# Patient Record
Sex: MALE | Race: OTHER | NOT HISPANIC OR LATINO
[De-identification: names, ages, dates, MRNs, and addresses within clinical notes are randomized per-mention and may not be internally consistent; named-entity substitution may affect disease eponyms.]

---

## 2017-04-18 ENCOUNTER — APPOINTMENT (OUTPATIENT)
Dept: UROLOGY | Facility: CLINIC | Age: 57
End: 2017-04-18

## 2017-05-04 ENCOUNTER — OUTPATIENT (OUTPATIENT)
Dept: OUTPATIENT SERVICES | Facility: HOSPITAL | Age: 57
LOS: 1 days | End: 2017-05-04
Payer: COMMERCIAL

## 2017-05-04 PROCEDURE — 75571 CT HRT W/O DYE W/CA TEST: CPT

## 2017-05-04 PROCEDURE — 75571 CT HRT W/O DYE W/CA TEST: CPT | Mod: 26

## 2017-05-11 LAB — PSA SERPL-MCNC: 0.61 NG/ML

## 2018-02-07 ENCOUNTER — EMERGENCY (EMERGENCY)
Facility: HOSPITAL | Age: 58
LOS: 1 days | Discharge: HOME | End: 2018-02-07
Admitting: EMERGENCY MEDICINE
Payer: COMMERCIAL

## 2018-02-07 VITALS
DIASTOLIC BLOOD PRESSURE: 92 MMHG | RESPIRATION RATE: 16 BRPM | TEMPERATURE: 98 F | HEIGHT: 69 IN | HEART RATE: 85 BPM | SYSTOLIC BLOOD PRESSURE: 172 MMHG | WEIGHT: 199.96 LBS | OXYGEN SATURATION: 99 %

## 2018-02-07 DIAGNOSIS — E11.9 TYPE 2 DIABETES MELLITUS WITHOUT COMPLICATIONS: ICD-10-CM

## 2018-02-07 DIAGNOSIS — Z79.4 LONG TERM (CURRENT) USE OF INSULIN: ICD-10-CM

## 2018-02-07 DIAGNOSIS — H72.92 UNSPECIFIED PERFORATION OF TYMPANIC MEMBRANE, LEFT EAR: ICD-10-CM

## 2018-02-07 DIAGNOSIS — H92.02 OTALGIA, LEFT EAR: ICD-10-CM

## 2018-02-07 DIAGNOSIS — Z79.899 OTHER LONG TERM (CURRENT) DRUG THERAPY: ICD-10-CM

## 2018-02-07 PROCEDURE — 99284 EMERGENCY DEPT VISIT MOD MDM: CPT

## 2018-02-07 PROCEDURE — 99283 EMERGENCY DEPT VISIT LOW MDM: CPT

## 2018-02-07 RX ORDER — OFLOXACIN OTIC SOLUTION 3 MG/ML
5 SOLUTION/ DROPS AURICULAR (OTIC)
Qty: 5 | Refills: 0 | OUTPATIENT
Start: 2018-02-07 | End: 2018-02-16

## 2018-02-07 RX ORDER — METFORMIN HYDROCHLORIDE 850 MG/1
1 TABLET ORAL
Qty: 0 | Refills: 0 | COMMUNITY

## 2018-02-07 NOTE — ED ADULT NURSE NOTE - CHPI ED SYMPTOMS NEG
no fever/no loss of consciousness/no nausea/no change in level of consciousness/no syncope/no chills/no numbness/no blurred vision/no weakness/no vomiting

## 2018-02-07 NOTE — ED PROVIDER NOTE - MEDICAL DECISION MAKING DETAILS
pt with perfed TM will DC with fu and ABs and drops pain meds and avoid water I have discussed the discharge plan with the patient. The patient agrees with the plan, as discussed.  The patient understands Emergency Department diagnosis is a preliminary diagnosis often based on limited information and that the patient must adhere to the follow-up plan as discussed.  The patient understands that if the symptoms worsen or if prescribed medications do not have the desired/planned effect that the patient may return to the Emergency Department at any time for further evaluation and treatment.

## 2018-02-07 NOTE — ED PROVIDER NOTE - OBJECTIVE STATEMENT
Pt is a 59yo male with PMH of DM complaining of sharp left ear pain x1week. He states that he's seen his primary care doctor twice for irrigation of cerumen impaction over the last week with some relief of pain. He also reports some relief with 600mg Advil PRN. He reports the pain is worsened by biting down, felt inside his ear, and travels down the left side of his jaw and up into the left side of his forehead. He states he last saw a dentist 1month ago with no issues. He also complains of decreased hearing on the left side worsening over the last week. Pt denies chest pain, dizziness, vertigo, lightheadedness, nausea, vomiting, or any other complaints.

## 2018-02-07 NOTE — ED ADULT NURSE NOTE - OBJECTIVE STATEMENT
Pt reports feeling pain in the left ear starting two weeks ago, went to see his doctor and cleaned his ears but felt no relief. Pt reports that starting the last week he started feeling the ear pain radiating to his jaw and forehead, and is "having trouble hearing but there is a lot of wax in there" and is having difficulty chewing d/t pain. Pt reports taking 600mg of Advil this morning and feeling no pain now. Pt denies chills, fever, dizziness, weakness.

## 2018-02-07 NOTE — ED PROVIDER NOTE - ENMT, MLM
Airway patent, Nasal mucosa clear. Mouth with normal mucosa. Throat has no vesicles, no oropharyngeal exudates and uvula is midline. TM left side large perforation no DC

## 2018-03-13 ENCOUNTER — APPOINTMENT (OUTPATIENT)
Dept: OTOLARYNGOLOGY | Facility: CLINIC | Age: 58
End: 2018-03-13
Payer: COMMERCIAL

## 2018-03-13 VITALS
HEART RATE: 80 BPM | DIASTOLIC BLOOD PRESSURE: 74 MMHG | BODY MASS INDEX: 29.33 KG/M2 | OXYGEN SATURATION: 96 % | HEIGHT: 69 IN | SYSTOLIC BLOOD PRESSURE: 163 MMHG | WEIGHT: 198 LBS | RESPIRATION RATE: 18 BRPM | TEMPERATURE: 98.5 F

## 2018-03-13 VITALS — BODY MASS INDEX: 29.33 KG/M2 | WEIGHT: 198 LBS | HEIGHT: 69 IN

## 2018-03-13 DIAGNOSIS — H92.09 OTALGIA, UNSPECIFIED EAR: ICD-10-CM

## 2018-03-13 DIAGNOSIS — H91.21 SUDDEN IDIOPATHIC HEARING LOSS, RIGHT EAR: ICD-10-CM

## 2018-03-13 DIAGNOSIS — H93.11 TINNITUS, RIGHT EAR: ICD-10-CM

## 2018-03-13 DIAGNOSIS — H70.12 CHRONIC MASTOIDITIS, LEFT EAR: ICD-10-CM

## 2018-03-13 PROCEDURE — 99204 OFFICE O/P NEW MOD 45 MIN: CPT

## 2018-03-14 PROBLEM — H91.21 HEARING LOSS, SUDDEN, RIGHT: Status: ACTIVE | Noted: 2018-03-14

## 2018-03-14 PROBLEM — H93.11 TINNITUS OF RIGHT EAR: Status: ACTIVE | Noted: 2018-03-14

## 2018-03-14 PROBLEM — H70.12 CHRONIC MASTOIDITIS OF LEFT SIDE: Status: ACTIVE | Noted: 2018-03-14

## 2018-03-14 RX ORDER — INSULIN GLARGINE 300 U/ML
300 INJECTION, SOLUTION SUBCUTANEOUS
Qty: 9 | Refills: 0 | Status: ACTIVE | COMMUNITY
Start: 2017-05-23

## 2018-04-02 ENCOUNTER — APPOINTMENT (OUTPATIENT)
Dept: OTOLARYNGOLOGY | Facility: CLINIC | Age: 58
End: 2018-04-02

## 2018-04-17 ENCOUNTER — APPOINTMENT (OUTPATIENT)
Dept: UROLOGY | Facility: CLINIC | Age: 58
End: 2018-04-17

## 2018-07-24 PROBLEM — E11.9 TYPE 2 DIABETES MELLITUS WITHOUT COMPLICATIONS: Chronic | Status: ACTIVE | Noted: 2018-02-07

## 2018-10-30 ENCOUNTER — APPOINTMENT (OUTPATIENT)
Dept: UROLOGY | Facility: CLINIC | Age: 58
End: 2018-10-30

## 2018-12-11 ENCOUNTER — APPOINTMENT (OUTPATIENT)
Dept: UROLOGY | Facility: CLINIC | Age: 58
End: 2018-12-11

## 2019-03-29 ENCOUNTER — APPOINTMENT (OUTPATIENT)
Dept: UROLOGY | Facility: CLINIC | Age: 59
End: 2019-03-29
Payer: COMMERCIAL

## 2019-03-29 VITALS
SYSTOLIC BLOOD PRESSURE: 130 MMHG | RESPIRATION RATE: 18 BRPM | TEMPERATURE: 98 F | HEART RATE: 76 BPM | DIASTOLIC BLOOD PRESSURE: 74 MMHG

## 2019-03-29 PROCEDURE — 99213 OFFICE O/P EST LOW 20 MIN: CPT

## 2019-03-29 NOTE — HISTORY OF PRESENT ILLNESS
[Nocturia] : nocturia [FreeTextEntry1] : Patient following up for history of bacterial prostatitis, elevated PSA with prior negative biopsy and LUTs due to BPH managed with finasteride. He is doing well on present regimen with stable non-bothersome symptoms as detailed below. He has experienced no symptoms in last 6 months consistent with prostatitis. 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 \par   [Urinary Incontinence] : no urinary incontinence [Urinary Urgency] : no urinary urgency [Urinary Frequency] : no urinary frequency [Straining] : no straining [Weak Stream] : no weak stream [Intermittency] : no intermittency [Dysuria] : no dysuria [Hematuria - Gross] : no gross hematuria [Bladder Spasm] : no bladder spasm [Abdominal Pain] : no abdominal pain

## 2019-03-29 NOTE — PHYSICAL EXAM
[General Appearance - Well Developed] : well developed [General Appearance - Well Nourished] : well nourished [Abdomen Soft] : soft [Abdomen Tenderness] : non-tender [Abdomen Mass (___ Cm)] : no abdominal mass palpated [Abdomen Hernia] : no hernia was discovered [Penis Abnormality] : normal uncircumcised penis [Urinary Bladder Findings] : the bladder was normal on palpation [Epididymis] : the epididymides were normal [Testes Mass (___cm)] : there were no testicular masses [Rectal Exam - Rectum] : digital rectal exam was normal [Prostate Tenderness] : the prostate was not tender [No Prostate Nodules] : no prostate nodules [Prostate Size ___ gm] : prostate size [unfilled] gm [Edema] : no peripheral edema [] : no respiratory distress [Exaggerated Use Of Accessory Muscles For Inspiration] : no accessory muscle use [Oriented To Time, Place, And Person] : oriented to person, place, and time [Normal Station and Gait] : the gait and station were normal for the patient's age [No Focal Deficits] : no focal deficits [Inguinal Lymph Nodes Enlarged Bilaterally] : inguinal

## 2019-08-05 NOTE — ED ADULT NURSE NOTE - CAS DISCH CONDITION
the visualized lower thoracic    esophagus. 2. Cholelithiasis with gallbladder distention and biliary tree distention. Correlation with liver function testing is recommended to determine if an MRCP    may be indicated. 3. Cardiomegaly. 4. Additional nonacute findings as described above. THIS REPORT CONTAINS FINDINGS THAT MAY BE CRITICAL TO PATIENT CARE. The    findings were verbally communicated via telephone conference with Yoly Soria at 4:30 AM EDT on 8/1/2019. The findings were acknowledged and    understood. This report has been electronically signed by Caryn Lu MD.          Prior to Admission medications    Medication Sig Start Date End Date Taking? Authorizing Provider   cyclobenzaprine (FLEXERIL) 10 MG tablet Take 10 mg by mouth 3 times daily as needed for Muscle spasms    Historical Provider, MD   magnesium hydroxide (MILK OF MAGNESIA) 400 MG/5ML suspension Take by mouth daily as needed for Constipation    Historical Provider, MD   oxyCODONE-acetaminophen (PERCOCET) 5-325 MG per tablet Take 1 tablet by mouth every 4 hours as needed for Pain.     Historical Provider, MD   bisacodyl (DULCOLAX) 10 MG suppository Place 10 mg rectally daily as needed for Constipation    Historical Provider, MD   Artificial Tear Solution (SOOTHE XP) SOLN Place 1 drop into both eyes every morning    Historical Provider, MD   Artificial Tear Solution (SOOTHE XP) SOLN Place 1 drop into the left eye 3 times daily    Historical Provider, MD   acetaminophen (APAP EXTRA STRENGTH) 500 MG tablet Take 2 tablets by mouth every 6 hours as needed for Pain 5/31/19   JERMAN Austin - CNP   furosemide (LASIX) 20 MG tablet Take 40 mg by mouth 2 times daily  2/8/19   Historical Provider, MD   aspirin 81 MG tablet Take 81 mg by mouth nightly     Historical Provider, MD   Cholecalciferol (VITAMIN D3) 1000 UNITS TABS Take 1 tablet by mouth every morning     Historical Provider, MD   carbidopa-levodopa (SINEMET)  MG per tablet Take 1 tablet by mouth 2 times daily     Historical Provider, MD        Current Facility-Administered Medications   Medication Dose Route Frequency Provider Last Rate Last Dose    acetaminophen (TYLENOL) tablet 650 mg  650 mg Oral Q4H PRN Michael Vallecillo MD   650 mg at 08/05/19 1236    enoxaparin (LOVENOX) injection 30 mg  30 mg Subcutaneous Daily Michael Vallecillo MD   30 mg at 08/04/19 4061    lactated ringers infusion   Intravenous Continuous Michael Vallecillo MD 75 mL/hr at 08/05/19 0854      diltiazem 100 mg in dextrose 5 % 100 mL infusion  5 mg/hr Intravenous Continuous Michael Vallecillo MD 2.5 mL/hr at 08/05/19 0345 2.5 mg/hr at 08/05/19 0345    perflutren lipid microspheres (DEFINITY) injection 1.65 mg  1.5 mL Intravenous ONCE PRN Michael Vallecillo MD        medicated lip balm (BLISTEX/CARMEX) stick   Topical PRN Michael Vallecillo MD        sodium chloride flush 0.9 % injection 10 mL  10 mL Intravenous BID Michael Vallecillo MD   10 mL at 08/02/19 0916    magnesium hydroxide (MILK OF MAGNESIA) 400 MG/5ML suspension 30 mL  30 mL Oral Daily PRN Michael Vallecillo MD        ondansetron [facility]) injection 4 mg  4 mg Intravenous Q6H PRN Michael Vallecillo MD               Problem list:    Active Problems:    SBO (small bowel obstruction) ([address]  Resolved Problems:    * No resolved hospital problems. *      Assessment:    1. Small bowel obstruction     2.  Parkinson's disease     3. Intravascular dehydration     4.  Essential hypertension     5. Osteoporosis with recurrent compression fractures     6. New onset atrial fibrillation    Plan:    .  Resume home medical regimen    2. Continue postop management    3. Advance diet and activity as tolerated    4. Continue rate control strategy per cardiology    5.   Resume Sinemet as at home      Joel Grant D.O.  2:43 PM  8/5/2019 Stable

## 2019-10-18 ENCOUNTER — APPOINTMENT (OUTPATIENT)
Dept: ORTHOPEDIC SURGERY | Facility: CLINIC | Age: 59
End: 2019-10-18

## 2019-10-18 ENCOUNTER — APPOINTMENT (OUTPATIENT)
Dept: ORTHOPEDIC SURGERY | Facility: CLINIC | Age: 59
End: 2019-10-18
Payer: COMMERCIAL

## 2019-10-18 VITALS
WEIGHT: 160 LBS | HEART RATE: 72 BPM | HEIGHT: 69 IN | DIASTOLIC BLOOD PRESSURE: 79 MMHG | BODY MASS INDEX: 23.7 KG/M2 | SYSTOLIC BLOOD PRESSURE: 143 MMHG

## 2019-10-18 DIAGNOSIS — M89.9 DISORDER OF BONE, UNSPECIFIED: ICD-10-CM

## 2019-10-18 DIAGNOSIS — M25.532 PAIN IN LEFT WRIST: ICD-10-CM

## 2019-10-18 PROCEDURE — 73110 X-RAY EXAM OF WRIST: CPT | Mod: LT

## 2019-10-18 PROCEDURE — 73130 X-RAY EXAM OF HAND: CPT | Mod: LT

## 2019-10-18 PROCEDURE — 99204 OFFICE O/P NEW MOD 45 MIN: CPT

## 2019-10-18 NOTE — DISCUSSION/SUMMARY
[de-identified] : Left wrist pain and swelling and loss of motion\par Left wrist bone lesions\par \par I discussed my findings and history exam and radiology recommend\par \par MRI with IV contrast of the left wrist to rule out malignancy or infection given the history of fungal infection in the skull\par \par The patient was prescribed Diclofenac PO non-steroidal anti-inflammatory medication. 50mg tablets twice daily to be taken for at least 1-2 weeks in a row and then PRN afterwards. Risks and benefits were discussed and include but not limited to renal damage and GI ulceration and bleeding.  They were advised to take with food to limit stomach upset as well as warned to stop the medication if worsening gastric pain or dizziness or other side effects. Also to immediately stop the medication and seek appropriate medical attention if any severe stomach ache, gastritis, black/red vomit, black/red stools or any other medical concern.\par \par The patient was prescribed Diclofenac topical liquid/creme non-steroidal anti-inflammatory medication. 1-2 pumps twice daily and apply to area with pain. There is low systemic absorption of the medication but risks while reduced remain were discussed and include but not limited to renal damage and GI ulceration and bleeding. They were warned to stop the medication if worsening buring skin or gastric pain or dizziness or other side effects. Also to immediately stop the medication and seek appropriate medical attention if any severe stomach ache, gastritis, black/red vomit, black/red stools or any other medical concern.\par \par Followup after MRI scan complete

## 2019-10-18 NOTE — HISTORY OF PRESENT ILLNESS
[de-identified] : CC Left Hand\par \par HPI 58 yo male. right HD presents with acute onset of 4 weeks pain in the constant pain in the volar wrist Left hand [without injury]. The pain is works, and rated a 8 out of 10, described as throbbing aching sharp, [without radiation]. Nothing makes the pain better and everything makes the pain worse. The patient reports associated symptoms of swelling loss of motion of the hands. The patient + pain at night affecting sleep, - neck pain, and - similar pain previously.\par \par The patient has tried the following treatments:\par Activity modification	+\par Ice			+\par Brace			+\par Nsaids    		+\par Physical Therapy  	-\par Cortisone Injection	-\par Arthroscopy/Surgery	-\par \par Review of Systems is positive for the above musculoskeletal symptoms and is otherwise non-contributory for general, constitutional, psychiatric, neurologic, HEENT, cardiac, respiratory, gastrointestinal, reproductive, lymphatic, and dermatologic complaints.\par \par Consult by

## 2019-10-18 NOTE — PHYSICAL EXAM
[de-identified] : Physical Examination\par General: well nourished, in no acute distress, alert and oriented to person, place and time\par Psychiatric: normal mood and affect, no abnormal movements or speech patterns, decreased hearing\par Eyes: vision intact - glasses\par Throat: no thyromegaly\par Lymph: no enlarged nodes, no lymphedema in extremity\par Respiratory: no wheezing, no shortness of breath with ambulation\par Cardiac: no cardiac leg swelling, 2+ peripheral pulses\par Neurology: normal gross sensation in extremities to light touch\par Abdomen: soft, non-tender, tympanic, no masses\par \par Musculoskeletal Examination\par Cervical spine		Full painless range of motion and negative Spurling's test\par \par Hand			Right			Left\par Appearance\par      Skin			normal			fullness volar wrist\par      Swelling/Deformity	normal			swelling of the wrist and hand and digits more volar\par  Range of Motion\par      Wrist Flexion		60			30\par      Wrist Extension	60			30\par      Finger Flexion  	0 cm palmar crease	1 cm palmar crease\par      Finger Extension	Full			loss 10 degrees per joint\par Palpation\par      Snuff box		-			-\par      ScaphoLunate 	-			-\par      ECU/Ulna Styloid	-			-\par      Cubital Tunnel 	-			-\par      OTHER		-			volar wrist w fullness\par Strength Examination\par      Wrist Flexion		5+ / 0			4+ / +\par      Wrist Extension	5+ / 0			4+ / +\par      Finger Flexion  	5+ / 0			4+ / +\par      Finger Extension	5+ / 0			4+ / +\par      			-			weak pain\par      Pincer		-			-\par Special Examination\par      Finklesteins		-			-\par      Carpal Tinnel		-			+pain\par      Carpal Compression	-			-\par      Phalens		-			+ pain\par      Ulnar Canal Tinnel	-			-\par      Cubital Tunnel Tinnel	-			-\par      Zuniga's		-			-\par Sensation\par      Axillary		normal			normal\par      LatAntCubBrach 	normal			normal\par      Median 		normal			normal\par      Ulnar 		normal			normal\par      Radial 		normal			normal\par Motor\par      AIN 			normal			normal\par      Ulnar 		normal			normal\par      Radial 		normal			normal\par      PIN 			normal			normal\par Pulses\par      Radial	 	2+			2+\par  [de-identified] : 3 views of the left hand and wrist were ordered taken and a evaluated today and demonstrate\par There is no osteoarthritic changes or evidence of AVN of the lunate\par Does not demonstrate any soft tissue calcific lesions\par There are 2 lytic lesions within the ulnar head multiple small lytic lesions within the carpal bones\par They appear permeative with indistinct edges\par

## 2020-03-02 ENCOUNTER — APPOINTMENT (OUTPATIENT)
Dept: ORTHOPEDIC SURGERY | Facility: CLINIC | Age: 60
End: 2020-03-02

## 2020-03-09 ENCOUNTER — APPOINTMENT (OUTPATIENT)
Dept: OTOLARYNGOLOGY | Facility: CLINIC | Age: 60
End: 2020-03-09

## 2020-03-19 NOTE — ED ADULT TRIAGE NOTE - NS ED NURSE DIRECT TO ROOM YN
oseltamivir 6mg/ml  45 mg Oral BID    sulfacetamide  1 drop Left Eye 4x Daily    aspirin  81 mg Oral Daily    chlorhexidine  15 mL Mouth/Throat BID    ampicillin-sulbactam  3 g Intravenous 4x Daily    amiodarone  400 mg Oral BID    fludrocortisone  0.1 mg Oral Daily    levothyroxine  125 mcg Oral Daily    sodium chloride flush  10 mL Intravenous 2 times per day    albuterol  2.5 mg Nebulization Q4H    hydrocortisone sodium succinate PF  50 mg Intravenous Q6H    pantoprazole  40 mg Intravenous Daily    And    sodium chloride (PF)  10 mL Intravenous Daily     PRN Meds: glucose, dextrose, glucagon (rDNA), dextrose, dextrose, heparin (porcine), heparin (porcine), sodium chloride flush, acetaminophen **OR** acetaminophen, polyethylene glycol, promethazine **OR** [DISCONTINUED] ondansetron      Labs and Imaging Studies     CBC:   Recent Labs     03/17/20  0410 03/18/20  0426 03/19/20  0400   WBC 29.5* 18.5* 9.7   HGB 10.7* 9.9* 8.9*   HCT 34.2* 32.2* 29.7*   MCV 77.2* 78.7* 79.8*    235 226       BMP:    Recent Labs     03/18/20  1350 03/18/20  2310 03/19/20  0400   * 154* 153*   K 3.5 3.2* 3.8   * 117* 116*   CO2 27 27 26   BUN 27* 29* 30*   CREATININE 1.5* 1.4* 1.4*   GLUCOSE 153* 161* 164*       LIVER PROFILE:   No results for input(s): AST, ALT, LIPASE, BILIDIR, BILITOT, ALKPHOS in the last 72 hours. Invalid input(s): AMYLASE,  ALB    PT/INR:   No results for input(s): PROTIME, INR in the last 72 hours.     APTT:   Recent Labs     03/17/20 2030 03/18/20  0327 03/19/20  0400   APTT 49.0* 62.4* 59.3*       Fasting Lipid Panel:    No results found for: CHOL, TRIG, HDL    Cardiac Enzymes:    Lab Results   Component Value Date    CKTOTAL 98 03/19/2020    CKTOTAL 147 03/18/2020    CKTOTAL 294 (H) 03/17/2020    CKMB 12.9 (H) 03/14/2020    TROPONINI 0.18 (H) 03/15/2020    TROPONINI 0.16 (H) 03/14/2020    TROPONINI 0.12 (H) 03/14/2020       Notable Cultures:      Blood cultures   Blood chest CT did show bilateral infiltrates, CT head, CT spine, CT abdomen were unremarkable. -                Neurologic  Altered mental status, improved  CT head, CT spine unremarkable  Neg  ammonia, UDS/SDS  Normal urine and serum osmolalities        Cardiovascular  Septic shock, resolved  2/2 pneumonia  On Unasyn  follow pancultures  Respiratory panel flu A+  Follow ABG/chest x-ray daily  CXR R sided infiltrate  Continue oseltamivir for a total of 10 days     VT/SVT  On amiodarone drip/heparin drip  Follow echo   cardiology following  Trending up  trop, CK, CK-MB  Cardio recommendations  amio drip changed to oral amio  On heparin drip     Pulmonary  Acute hypoxic/hypercapnic respiratory failure  Secondary to pneumonia  Liberated from mechanical ventilation today  On Unasyn/follow pancultures    Gastrointestinal  PUD history  PPIs daily       Endocrine  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 Giselle Tesfaye    I personally saw, examined and provided care for the patient. Radiographs, labs and medication list were reviewed by me independently. Review of Residents documentation was conducted and revisions were made as appropriate. I agree with the above documented exam, problem list and plan of care.         CCT excluding procedures 35 minutes    Electronically signed by Rosemarie Sharif DO on 3/19/2020 at 10:08 PM No

## 2020-03-29 ENCOUNTER — INPATIENT (INPATIENT)
Facility: HOSPITAL | Age: 60
LOS: 2 days | Discharge: HOME | DRG: 177 | End: 2020-04-01
Attending: INTERNAL MEDICINE | Admitting: INTERNAL MEDICINE
Payer: COMMERCIAL

## 2020-03-29 VITALS
WEIGHT: 145.06 LBS | HEART RATE: 89 BPM | HEIGHT: 69 IN | DIASTOLIC BLOOD PRESSURE: 65 MMHG | RESPIRATION RATE: 18 BRPM | SYSTOLIC BLOOD PRESSURE: 128 MMHG | TEMPERATURE: 100 F | OXYGEN SATURATION: 99 %

## 2020-03-29 DIAGNOSIS — M86.68 OTHER CHRONIC OSTEOMYELITIS, OTHER SITE: ICD-10-CM

## 2020-03-29 DIAGNOSIS — H91.93 UNSPECIFIED HEARING LOSS, BILATERAL: ICD-10-CM

## 2020-03-29 DIAGNOSIS — E87.1 HYPO-OSMOLALITY AND HYPONATREMIA: ICD-10-CM

## 2020-03-29 DIAGNOSIS — G47.33 OBSTRUCTIVE SLEEP APNEA (ADULT) (PEDIATRIC): ICD-10-CM

## 2020-03-29 DIAGNOSIS — Z79.4 LONG TERM (CURRENT) USE OF INSULIN: ICD-10-CM

## 2020-03-29 DIAGNOSIS — E11.9 TYPE 2 DIABETES MELLITUS WITHOUT COMPLICATIONS: ICD-10-CM

## 2020-03-29 DIAGNOSIS — R63.8 OTHER SYMPTOMS AND SIGNS CONCERNING FOOD AND FLUID INTAKE: ICD-10-CM

## 2020-03-29 DIAGNOSIS — M86.9 OSTEOMYELITIS, UNSPECIFIED: ICD-10-CM

## 2020-03-29 DIAGNOSIS — J96.01 ACUTE RESPIRATORY FAILURE WITH HYPOXIA: ICD-10-CM

## 2020-03-29 DIAGNOSIS — B49 UNSPECIFIED MYCOSIS: ICD-10-CM

## 2020-03-29 DIAGNOSIS — U07.1 COVID-19: ICD-10-CM

## 2020-03-29 DIAGNOSIS — J12.89 OTHER VIRAL PNEUMONIA: ICD-10-CM

## 2020-03-29 DIAGNOSIS — N17.9 ACUTE KIDNEY FAILURE, UNSPECIFIED: ICD-10-CM

## 2020-03-29 DIAGNOSIS — R50.9 FEVER, UNSPECIFIED: ICD-10-CM

## 2020-03-29 DIAGNOSIS — B34.2 CORONAVIRUS INFECTION, UNSPECIFIED: ICD-10-CM

## 2020-03-29 DIAGNOSIS — Z87.438 PERSONAL HISTORY OF OTHER DISEASES OF MALE GENITAL ORGANS: ICD-10-CM

## 2020-03-29 DIAGNOSIS — Z99.89 DEPENDENCE ON OTHER ENABLING MACHINES AND DEVICES: ICD-10-CM

## 2020-03-29 DIAGNOSIS — E11.69 TYPE 2 DIABETES MELLITUS WITH OTHER SPECIFIED COMPLICATION: ICD-10-CM

## 2020-03-29 DIAGNOSIS — N40.0 BENIGN PROSTATIC HYPERPLASIA WITHOUT LOWER URINARY TRACT SYMPTOMS: ICD-10-CM

## 2020-03-29 LAB
ALBUMIN SERPL ELPH-MCNC: 3.4 G/DL — SIGNIFICANT CHANGE UP (ref 3.3–5)
ALP SERPL-CCNC: 85 U/L — SIGNIFICANT CHANGE UP (ref 40–120)
ALT FLD-CCNC: 14 U/L — SIGNIFICANT CHANGE UP (ref 10–45)
ANION GAP SERPL CALC-SCNC: 15 MMOL/L — SIGNIFICANT CHANGE UP (ref 5–17)
APTT BLD: 29.1 SEC — SIGNIFICANT CHANGE UP (ref 27.5–36.3)
AST SERPL-CCNC: 26 U/L — SIGNIFICANT CHANGE UP (ref 10–40)
BASOPHILS # BLD AUTO: 0.01 K/UL — SIGNIFICANT CHANGE UP (ref 0–0.2)
BASOPHILS NFR BLD AUTO: 0.1 % — SIGNIFICANT CHANGE UP (ref 0–2)
BILIRUB SERPL-MCNC: 0.3 MG/DL — SIGNIFICANT CHANGE UP (ref 0.2–1.2)
BUN SERPL-MCNC: 28 MG/DL — HIGH (ref 7–23)
CALCIUM SERPL-MCNC: 9 MG/DL — SIGNIFICANT CHANGE UP (ref 8.4–10.5)
CHLORIDE SERPL-SCNC: 94 MMOL/L — LOW (ref 96–108)
CO2 SERPL-SCNC: 22 MMOL/L — SIGNIFICANT CHANGE UP (ref 22–31)
CREAT SERPL-MCNC: 1.56 MG/DL — HIGH (ref 0.5–1.3)
CRP SERPL-MCNC: 20.7 MG/DL — HIGH (ref 0–0.4)
D DIMER BLD IA.RAPID-MCNC: 418 NG/ML DDU — HIGH
EOSINOPHIL # BLD AUTO: 0 K/UL — SIGNIFICANT CHANGE UP (ref 0–0.5)
EOSINOPHIL NFR BLD AUTO: 0 % — SIGNIFICANT CHANGE UP (ref 0–6)
FERRITIN SERPL-MCNC: 999 NG/ML — HIGH (ref 30–400)
GLUCOSE BLDC GLUCOMTR-MCNC: 266 MG/DL — HIGH (ref 70–99)
GLUCOSE SERPL-MCNC: 287 MG/DL — HIGH (ref 70–99)
HCT VFR BLD CALC: 35.2 % — LOW (ref 39–50)
HGB BLD-MCNC: 11.4 G/DL — LOW (ref 13–17)
IMM GRANULOCYTES NFR BLD AUTO: 0.7 % — SIGNIFICANT CHANGE UP (ref 0–1.5)
INR BLD: 1.29 — HIGH (ref 0.88–1.16)
LACTATE SERPL-SCNC: 1.4 MMOL/L — SIGNIFICANT CHANGE UP (ref 0.5–2)
LYMPHOCYTES # BLD AUTO: 0.61 K/UL — LOW (ref 1–3.3)
LYMPHOCYTES # BLD AUTO: 6.1 % — LOW (ref 13–44)
MCHC RBC-ENTMCNC: 29 PG — SIGNIFICANT CHANGE UP (ref 27–34)
MCHC RBC-ENTMCNC: 32.4 GM/DL — SIGNIFICANT CHANGE UP (ref 32–36)
MCV RBC AUTO: 89.6 FL — SIGNIFICANT CHANGE UP (ref 80–100)
MONOCYTES # BLD AUTO: 0.57 K/UL — SIGNIFICANT CHANGE UP (ref 0–0.9)
MONOCYTES NFR BLD AUTO: 5.7 % — SIGNIFICANT CHANGE UP (ref 2–14)
NEUTROPHILS # BLD AUTO: 8.76 K/UL — HIGH (ref 1.8–7.4)
NEUTROPHILS NFR BLD AUTO: 87.4 % — HIGH (ref 43–77)
NRBC # BLD: 0 /100 WBCS — SIGNIFICANT CHANGE UP (ref 0–0)
NT-PROBNP SERPL-SCNC: 197 PG/ML — SIGNIFICANT CHANGE UP (ref 0–300)
PLATELET # BLD AUTO: 219 K/UL — SIGNIFICANT CHANGE UP (ref 150–400)
POTASSIUM SERPL-MCNC: 4.8 MMOL/L — SIGNIFICANT CHANGE UP (ref 3.5–5.3)
POTASSIUM SERPL-SCNC: 4.8 MMOL/L — SIGNIFICANT CHANGE UP (ref 3.5–5.3)
PROCALCITONIN SERPL-MCNC: 0.15 NG/ML — HIGH (ref 0.02–0.1)
PROT SERPL-MCNC: 7.3 G/DL — SIGNIFICANT CHANGE UP (ref 6–8.3)
PROTHROM AB SERPL-ACNC: 14.8 SEC — HIGH (ref 10–12.9)
RBC # BLD: 3.93 M/UL — LOW (ref 4.2–5.8)
RBC # FLD: 12.8 % — SIGNIFICANT CHANGE UP (ref 10.3–14.5)
SARS-COV-2 RNA SPEC QL NAA+PROBE: SIGNIFICANT CHANGE UP
SODIUM SERPL-SCNC: 131 MMOL/L — LOW (ref 135–145)
TROPONIN T SERPL-MCNC: <0.01 NG/ML — SIGNIFICANT CHANGE UP (ref 0–0.01)
WBC # BLD: 10.02 K/UL — SIGNIFICANT CHANGE UP (ref 3.8–10.5)
WBC # FLD AUTO: 10.02 K/UL — SIGNIFICANT CHANGE UP (ref 3.8–10.5)

## 2020-03-29 PROCEDURE — 99285 EMERGENCY DEPT VISIT HI MDM: CPT

## 2020-03-29 PROCEDURE — 71045 X-RAY EXAM CHEST 1 VIEW: CPT | Mod: 26

## 2020-03-29 PROCEDURE — 99223 1ST HOSP IP/OBS HIGH 75: CPT | Mod: GC

## 2020-03-29 PROCEDURE — 93010 ELECTROCARDIOGRAM REPORT: CPT

## 2020-03-29 RX ORDER — HYDROXYCHLOROQUINE SULFATE 200 MG
TABLET ORAL
Refills: 0 | Status: DISCONTINUED | OUTPATIENT
Start: 2020-03-29 | End: 2020-03-29

## 2020-03-29 RX ORDER — SODIUM CHLORIDE 9 MG/ML
500 INJECTION INTRAMUSCULAR; INTRAVENOUS; SUBCUTANEOUS ONCE
Refills: 0 | Status: COMPLETED | OUTPATIENT
Start: 2020-03-29 | End: 2020-03-29

## 2020-03-29 RX ORDER — DEXTROSE 50 % IN WATER 50 %
25 SYRINGE (ML) INTRAVENOUS ONCE
Refills: 0 | Status: DISCONTINUED | OUTPATIENT
Start: 2020-03-29 | End: 2020-04-01

## 2020-03-29 RX ORDER — INSULIN LISPRO 100/ML
VIAL (ML) SUBCUTANEOUS
Refills: 0 | Status: DISCONTINUED | OUTPATIENT
Start: 2020-03-29 | End: 2020-04-01

## 2020-03-29 RX ORDER — ASCORBIC ACID 60 MG
1500 TABLET,CHEWABLE ORAL ONCE
Refills: 0 | Status: COMPLETED | OUTPATIENT
Start: 2020-03-29 | End: 2020-03-29

## 2020-03-29 RX ORDER — HYDROXYCHLOROQUINE SULFATE 200 MG
400 TABLET ORAL EVERY 12 HOURS
Refills: 0 | Status: COMPLETED | OUTPATIENT
Start: 2020-03-29 | End: 2020-03-30

## 2020-03-29 RX ORDER — ENOXAPARIN SODIUM 100 MG/ML
40 INJECTION SUBCUTANEOUS EVERY 24 HOURS
Refills: 0 | Status: DISCONTINUED | OUTPATIENT
Start: 2020-03-30 | End: 2020-04-01

## 2020-03-29 RX ORDER — AZITHROMYCIN 500 MG/1
500 TABLET, FILM COATED ORAL ONCE
Refills: 0 | Status: COMPLETED | OUTPATIENT
Start: 2020-03-29 | End: 2020-03-29

## 2020-03-29 RX ORDER — CEFTRIAXONE 500 MG/1
1000 INJECTION, POWDER, FOR SOLUTION INTRAMUSCULAR; INTRAVENOUS ONCE
Refills: 0 | Status: COMPLETED | OUTPATIENT
Start: 2020-03-29 | End: 2020-03-29

## 2020-03-29 RX ORDER — DEXTROSE 50 % IN WATER 50 %
15 SYRINGE (ML) INTRAVENOUS ONCE
Refills: 0 | Status: DISCONTINUED | OUTPATIENT
Start: 2020-03-29 | End: 2020-04-01

## 2020-03-29 RX ORDER — FAMOTIDINE 10 MG/ML
20 INJECTION INTRAVENOUS DAILY
Refills: 0 | Status: DISCONTINUED | OUTPATIENT
Start: 2020-03-29 | End: 2020-04-01

## 2020-03-29 RX ORDER — THIAMINE MONONITRATE (VIT B1) 100 MG
200 TABLET ORAL ONCE
Refills: 0 | Status: COMPLETED | OUTPATIENT
Start: 2020-03-29 | End: 2020-03-29

## 2020-03-29 RX ORDER — ASCORBIC ACID 60 MG
1500 TABLET,CHEWABLE ORAL EVERY 6 HOURS
Refills: 0 | Status: DISCONTINUED | OUTPATIENT
Start: 2020-03-29 | End: 2020-03-30

## 2020-03-29 RX ORDER — INSULIN GLARGINE 100 [IU]/ML
10 INJECTION, SOLUTION SUBCUTANEOUS AT BEDTIME
Refills: 0 | Status: DISCONTINUED | OUTPATIENT
Start: 2020-03-29 | End: 2020-04-01

## 2020-03-29 RX ORDER — DEXTROSE 50 % IN WATER 50 %
12.5 SYRINGE (ML) INTRAVENOUS ONCE
Refills: 0 | Status: DISCONTINUED | OUTPATIENT
Start: 2020-03-29 | End: 2020-04-01

## 2020-03-29 RX ORDER — ASCORBIC ACID 60 MG
1500 TABLET,CHEWABLE ORAL
Refills: 0 | Status: DISCONTINUED | OUTPATIENT
Start: 2020-03-29 | End: 2020-03-29

## 2020-03-29 RX ORDER — FINASTERIDE 5 MG/1
5 TABLET, FILM COATED ORAL DAILY
Refills: 0 | Status: DISCONTINUED | OUTPATIENT
Start: 2020-03-30 | End: 2020-04-01

## 2020-03-29 RX ORDER — ACETAMINOPHEN 500 MG
650 TABLET ORAL EVERY 6 HOURS
Refills: 0 | Status: DISCONTINUED | OUTPATIENT
Start: 2020-03-29 | End: 2020-04-01

## 2020-03-29 RX ORDER — ASCORBIC ACID 60 MG
1500 TABLET,CHEWABLE ORAL ONCE
Refills: 0 | Status: DISCONTINUED | OUTPATIENT
Start: 2020-03-29 | End: 2020-03-29

## 2020-03-29 RX ORDER — AZITHROMYCIN 500 MG/1
250 TABLET, FILM COATED ORAL EVERY 24 HOURS
Refills: 0 | Status: DISCONTINUED | OUTPATIENT
Start: 2020-03-30 | End: 2020-04-01

## 2020-03-29 RX ORDER — THIAMINE MONONITRATE (VIT B1) 100 MG
200 TABLET ORAL
Refills: 0 | Status: DISCONTINUED | OUTPATIENT
Start: 2020-03-30 | End: 2020-03-30

## 2020-03-29 RX ORDER — GLUCAGON INJECTION, SOLUTION 0.5 MG/.1ML
1 INJECTION, SOLUTION SUBCUTANEOUS ONCE
Refills: 0 | Status: DISCONTINUED | OUTPATIENT
Start: 2020-03-29 | End: 2020-04-01

## 2020-03-29 RX ORDER — ISAVUCONAZONIUM SULFATE 40 MG/1
2 CAPSULE ORAL EVERY 24 HOURS
Refills: 0 | Status: DISCONTINUED | OUTPATIENT
Start: 2020-03-30 | End: 2020-04-01

## 2020-03-29 RX ORDER — ACETAMINOPHEN 500 MG
650 TABLET ORAL ONCE
Refills: 0 | Status: COMPLETED | OUTPATIENT
Start: 2020-03-29 | End: 2020-03-29

## 2020-03-29 RX ORDER — HYDROXYCHLOROQUINE SULFATE 200 MG
200 TABLET ORAL EVERY 12 HOURS
Refills: 0 | Status: DISCONTINUED | OUTPATIENT
Start: 2020-03-31 | End: 2020-03-30

## 2020-03-29 RX ORDER — SODIUM CHLORIDE 9 MG/ML
1000 INJECTION, SOLUTION INTRAVENOUS
Refills: 0 | Status: DISCONTINUED | OUTPATIENT
Start: 2020-03-29 | End: 2020-04-01

## 2020-03-29 RX ADMIN — SODIUM CHLORIDE 83.33 MILLILITER(S): 9 INJECTION INTRAMUSCULAR; INTRAVENOUS; SUBCUTANEOUS at 19:58

## 2020-03-29 RX ADMIN — Medication 153 MILLIGRAM(S): at 18:10

## 2020-03-29 RX ADMIN — Medication 6: at 22:18

## 2020-03-29 RX ADMIN — Medication 650 MILLIGRAM(S): at 15:21

## 2020-03-29 RX ADMIN — Medication 102 MILLIGRAM(S): at 16:16

## 2020-03-29 RX ADMIN — AZITHROMYCIN 500 MILLIGRAM(S): 500 TABLET, FILM COATED ORAL at 16:15

## 2020-03-29 RX ADMIN — Medication 650 MILLIGRAM(S): at 16:36

## 2020-03-29 RX ADMIN — Medication 650 MILLIGRAM(S): at 22:28

## 2020-03-29 RX ADMIN — INSULIN GLARGINE 10 UNIT(S): 100 INJECTION, SOLUTION SUBCUTANEOUS at 22:19

## 2020-03-29 RX ADMIN — CEFTRIAXONE 100 MILLIGRAM(S): 500 INJECTION, POWDER, FOR SOLUTION INTRAMUSCULAR; INTRAVENOUS at 16:15

## 2020-03-29 NOTE — H&P ADULT - NSHPLABSRESULTS_GEN_ALL_CORE
LABS:                        11.4   10.02 )-----------( 219      ( 29 Mar 2020 15:04 )             35.2     03-29    131<L>  |  94<L>  |  28<H>  ----------------------------<  287<H>  4.8   |  22  |  1.56<H>    Ca    9.0      29 Mar 2020 15:04    TPro  7.3  /  Alb  3.4  /  TBili  0.3  /  DBili  x   /  AST  26  /  ALT  14  /  AlkPhos  85  03-29    PT/INR - ( 29 Mar 2020 15:04 )   PT: 14.8 sec;   INR: 1.29          PTT - ( 29 Mar 2020 15:04 )  PTT:29.1 sec      RADIOLOGY & ADDITIONAL TESTS:    MEDICATIONS  (STANDING):  ascorbic acid IVPB 1500 milliGRAM(s) IV Intermittent once    MEDICATIONS  (PRN):

## 2020-03-29 NOTE — H&P ADULT - PROBLEM SELECTOR PLAN 1
Tested positive outpatient, satting 96 on 3L in ED. F to 100.8.   - F/u covid-19 PCR  - Monitor O2 saturation, monitor for respiratory distress  - Nasal cannula as needed, avoid HFNC/BiPAP  - Tylenol 650mg for fever   - Started ascorbic acid 1500mg IV q6h  - Started thiamine 200mg IV BID  - Started hydroxyxhloroquine 400mg IV BID x2 doses, then 200mg BID for 4 days  - Started azithromycin 250mg QD  - F/u D-Dimer, Procalcitonin, Ferritin, ESR/CRP, LDH, CK  - F/u Quantiferon and G6PD. Tested positive outpatient, satting 96 on 3L in ED. F to 100.8.   - F/u covid-19 PCR  - Monitor O2 saturation, monitor for respiratory distress  - Nasal cannula as needed, avoid HFNC/BiPAP  - Tylenol 650mg for fever   - Started ascorbic acid 1500mg IV q6h  - Started thiamine 200mg IV BID  - Started hydroxyxhloroquine 400mg IV BID x2 doses, then 200mg BID for 4 days  - Started azithromycin 250mg QD  - F/u D-Dimer, Procalcitonin, Ferritin, ESR/CRP, LDH, CK  - F/u Quantiferon and G6PD.  - dc ceftriaxone Tested positive outpatient, satting 96 on 3L in ED. F to 100.8.   - Monitor O2 saturation, monitor for respiratory distress  - Nasal cannula as needed, avoid HFNC/BiPAP  - Tylenol 650mg for fever   - Started ascorbic acid 1500mg IV q6h  - Started thiamine 200mg IV BID  - Started hydroxyxhloroquine 400mg IV BID x2 doses, then 200mg BID for 4 days  - Started azithromycin 250mg QD  - F/u D-Dimer, Procalcitonin, Ferritin, ESR/CRP, LDH, CK  - F/u Quantiferon and G6PD.  - dc ceftriaxone, no need for total cap coverage given low procal, no wbc elevation, and already covid positive. Tested positive outpatient, satting 96 on 3L in ED. F to 100.8. 1 negative swab in ED (taken 6pm 3/29), per epidemiology will need 2 negative to be deemed negative. Given patietn coming in with respiratory symptoms cw covid, it is possible swab is false negative vs patient cleared/clearing virus.   -reswab in evening 3/30 to confirm negative status  - Monitor O2 saturation, monitor for respiratory distress  - Nasal cannula as needed, avoid HFNC/BiPAP  - Tylenol 650mg for fever   - Started ascorbic acid 1500mg IV q6h  - Started thiamine 200mg IV BID  - Started hydroxyxhloroquine 400mg IV BID x2 doses, then 200mg BID for 4 days  - Started azithromycin 250mg QD  - F/u D-Dimer, Procalcitonin, Ferritin, ESR/CRP, LDH, CK  - F/u Quantiferon and G6PD.  - dc ceftriaxone, no need for total cap coverage given low procal, no wbc elevation, and already covid positive.

## 2020-03-29 NOTE — H&P ADULT - PROBLEM SELECTOR PLAN 4
cw Cresemba On Toujeo at home, 1:1 to lantus, dose unknown. Pt poor PO over last week. Glu 287 on admission  - On Toujeo at home, 1:1 to lantus, dose unknown. Pt poor PO over last week. Glu 287 on admission  - 10 lantus at night  MISS On Toujeo at home, 1:1 to lantus, dose unknown. Pt poor PO over last week. Glu 287 on admission  - 10 lantus at night  - MISS

## 2020-03-29 NOTE — ED PROVIDER NOTE - CLINICAL SUMMARY MEDICAL DECISION MAKING FREE TEXT BOX
60M who tested + for covid 19 on 3/18/20 who p/w worsening SOb and fever. Pt hypoxic to low 90s on RA on arrival, improved with nasal cannula. Pt does not require emergent intubation at this time. Labs and cultures sent and patient was started on azithromycin, ceftriaxone, Vit C, and thiamine. Wt based fluids not given as pt Covid+ and lactate normal. CXR shows infiltrate c/w Covid infection. Pt will require admission. Stable for RMF at this time.

## 2020-03-29 NOTE — H&P ADULT - NSHPPHYSICALEXAM_GEN_ALL_CORE
Vital Signs Last 12 Hrs  T(F): 100.5 (03-29-20 @ 16:30), Max: 100.8 (03-29-20 @ 15:42)  HR: 86 (03-29-20 @ 15:42) (86 - 89)  BP: 156/70 (03-29-20 @ 15:42) (128/65 - 156/70)  BP(mean): --  RR: 20 (03-29-20 @ 15:42) (18 - 20)  SpO2: 96% (03-29-20 @ 15:42) (96% - 99%)  I&O's Summary      PHYSICAL EXAM:  Constitutional: NAD, comfortable in bed.  HEENT: NC/AT, PERRLA, EOMI, no conjunctival pallor or scleral icterus, MMM  Neck: Supple, no JVD  Respiratory: Normal rate, rhythm, depth, effort. CTAB. No w/r/r.   Cardiovascular: RRR, normal S1 and S2, no m/r/g.   Gastrointestinal: +BS, soft NTND, no guarding or rebound tenderness, no palpable masses   Extremities: wwp; no cyanosis, clubbing or edema.   Vascular: Pulses equal and strong throughout.   Neurological: AAOx3, no CN deficits, strength and sensation intact throughout.   Skin: No gross skin abnormalities or rashes Vital Signs Last 12 Hrs  T(F): 100.5 (03-29-20 @ 16:30), Max: 100.8 (03-29-20 @ 15:42)  HR: 86 (03-29-20 @ 15:42) (86 - 89)  BP: 156/70 (03-29-20 @ 15:42) (128/65 - 156/70)  BP(mean): --  RR: 20 (03-29-20 @ 15:42) (18 - 20)  SpO2: 96% (03-29-20 @ 15:42) (96% - 99%)  I&O's Summary      PHYSICAL EXAM:  Constitutional: NAD, comfortable in bed, sleeping comfortably, hard of hearing  HEENT: NC/AT, EOMI, no conjunctival pallor or scleral icterus, MMM  Neck: Supple, no JVD  Respiratory: Nonlabored breathing  Cardiovascular: RRR, normal S1 and S2, no m/r/g.   Gastrointestinal: soft NTND, no guarding or rebound tenderness, no palpable masses   Extremities: wwp, no edema  Vascular: Pulses equal and strong throughout.   Neurological: AAOx3, no CN deficits, strength and sensation intact throughout.   Skin: No gross skin abnormalities or rashes

## 2020-03-29 NOTE — H&P ADULT - PROBLEM SELECTOR PLAN 2
Avoid cpap/bipap in covid pts due to aersolization of virus  - monitor o2 while asleep Cr 1.56 on admission, endorsing low PO intake for ~1 week, likely prerenal  - f/u urine lytes  - encourage PO  - trend Cr Cr 1.56 on admission, endorsing low PO intake for ~1 week, likely prerenal  - f/u urine lytes  - encourage PO for now, can give 500cc over 6 hours.  - trend Cr Cr 1.56 on admission, endorsing low PO intake for ~1 week, likely prerenal  - f/u urine lytes  - encourage PO  - give 500cc over 6 hours.  - trend Cr

## 2020-03-29 NOTE — ED PROVIDER NOTE - OBJECTIVE STATEMENT
60M, poor historian, hard of hearing, on Cresemba (unclear why) who presents with fever and SOB x 5 days, tested positive for Covid as outpt on 3/18/20.   On arrival satting 93 % RA, given 3 L O2 sat 99%. 60M, poor historian, hard of hearing, on Cresemba (unclear why) who presents with fever and SOB x 5 days, tested positive for Covid as outpt on 3/18/20.     Wife Silvia Stokes [phone]

## 2020-03-29 NOTE — H&P ADULT - HISTORY OF PRESENT ILLNESS
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 Was swabbed on 3/18 outpatient, tested positive for COVID19. Was started on azithromycin by PCP on the 25th but missed his last 2 days of doses. 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   Denied c/n/v/d, chest pain, abd pain, dysuria, headaches, dizziness, lightheadedness.  Takes finasteride 5 daily for BPH.   ED course: T 100.8F, HR 86, F 100.8, satting 96 on 3-4Ls. EKG NSR  Vit C, thiamine, azithro, plaquenil    Patient cell phone (uses headphone and helps with hearing) [phone]  Wife Silvia cell: [phone] 61yo M pmh DM (on Toujeo), BPH, fungal osteomyelitis of the skull (diagnosed at [facility] 2 years ago, takes Cresemba indefinitely), deafness 2/2 same fungal infection (hears best in right ear), sleep apnea (cpap at night) who presented with 5 days of worsening sob and cough, fevers, and lethargy. Was swabbed on 3/18 outpatient, tested positive for COVID19. Was started on azithromycin by PCP on the 25th but missed his last 2 days of doses. 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 Lives at home with wife who is sick withi fevers and dry cough, and 11yr child healthy.   Denied c/n/v/d, chest pain, abd pain, dysuria, headaches, dizziness, lightheadedness. Sick contact is wife.  Takes finasteride 5 daily for BPH.   ED course: T 100.8F, /70, HR 86, F 100.8, satting 96 on 3-4Ls (documented incorrectly in flowsheet). EKG NSR. Tylenol 650mg, ceftriaxone 1g, azithro 500mg. Thiamine 200mg IV, ascorbic acid 1500mg IV.  Patient cell phone (uses headphone and helps with hearing) [phone]. Wife Silvia cell: [phone] 61yo M pmh DM (on Toujeo), BPH, fungal osteomyelitis of the skull (diagnosed at [facility] 2 years ago, takes Cresemba indefinitely), deafness 2/2 same fungal infection (hears best in right ear), sleep apnea (cpap at night) who presented with 5 days of worsening sob and cough, fevers, and lethargy. Was swabbed on 3/18 outpatient, tested positive for COVID19. Was started on azithromycin by PCP on the 25th but missed his last 2 days of doses. 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, coughing intensly throughout exam), pt has been in bed last 5 days, has not eating/drinking much, and is very weak. Pt says he has no appetite. Lives at home with wife who is sick withi fevers and dry cough, and 11yr child healthy.   Denied c/n/v/d, chest pain, abd pain, dysuria, headaches, dizziness, lightheadedness. Sick contact is wife.  Takes finasteride 5 daily for BPH.   ED course: T 100.8F, /70, HR 86, F 100.8, satting 96 on 3-4Ls (documented incorrectly in flowsheet). EKG NSR. Tylenol 650mg, ceftriaxone 1g, azithro 500mg. Thiamine 200mg IV, ascorbic acid 1500mg IV.  Patient cell phone (uses headphone and helps with hearing) [phone]. 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 Was swabbed on 3/18 outpatient, tested positive for COVID19. Was started on azithromycin by PCP on the 25th but missed his last 2 days of doses. 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, coughing intensly throughout exam), pt has been in bed last 5 days, has not eating/drinking much, and is very weak. Pt says he has no appetite. Lives at home with wife who is sick withi fevers and dry cough, and 11yr child healthy.   Denied c/n/v/d, chest pain, abd pain, dysuria, headaches, dizziness, lightheadedness. Sick contact is wife.  Takes finasteride 5 daily for BPH.   ED course: T 100.8F, /70, HR 86, satting 96 on 3-4Ls (documented incorrectly in flowsheet). EKG NSR. Tylenol 650mg, ceftriaxone 1g, azithro 500mg. Thiamine 200mg IV, ascorbic acid 1500mg IV.  Patient cell phone (uses headphone and helps with hearing) [phone]. 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 Was swabbed on 3/18 outpatient, tested positive for COVID19. Was started on azithromycin by PCP on the 25th but missed his last 2 days of doses. 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 Pt says he has no appetite. Lives at home with wife who is sick withi fevers and dry cough, and 11yr child healthy.   Denied c/n/v/d, chest pain, abd pain, dysuria, headaches, dizziness, lightheadedness. Sick contact is wife.  ED course: T 100.8F, /70, HR 86, satting 96 on 3-4Ls (documented incorrectly in flowsheet). EKG NSR. Tylenol 650mg, ceftriaxone 1g, azithro 500mg. Thiamine 200mg IV, ascorbic acid 1500mg IV. CXR with bilateral infiltrates cw multifocal pna/covid19  Patient cell phone (uses headphone and helps with hearing) [phone]. Wife Silvia cell: [phone]

## 2020-03-29 NOTE — H&P ADULT - ASSESSMENT
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

## 2020-03-29 NOTE — H&P ADULT - ATTENDING COMMENTS
60M w/ DM (on Toujeo), chronic fungal osteomyelitis of the skull (takes Cresemba indefinitely), deafness 2/2 same fungal infection, sleep apnea (cpap at night) p/w 5 days of worsening sob and cough, fevers, and lethargy already known to be covid positive from outpatient source    # Fever: COVID is clear source of infection given recent positive test.  F/u UA and cultures.  Chronic skull base osteo w/out acute findings, I don't think imaging is necessary as symptoms are also respiratory in nature.    # COVID-19: No hypoxia but w/ fever.  Like source of symptoms.  Biomarkers consistent w/ COVID.      # Skull base osteo 2/2 fungal infection: C/w home anti-fungal.  Does not seem to be source of new fevers.    # Hyponatremia: Dry on exam, gently hydrate.

## 2020-03-29 NOTE — ED ADULT TRIAGE NOTE - CHIEF COMPLAINT QUOTE
pt BIBA c/o SOB, x 5 days, sat 93 % RA, given 3 L O2 sat 99%. tested positive for covid 19 on 3/18/20

## 2020-03-29 NOTE — H&P ADULT - PROBLEM SELECTOR PLAN 5
finasteride 5mg daily cw Cresemba cw Cresemba 2 capsules daily (186mg x2) cw Cresemba 2 capsules daily (186mg x2). Pharmacy has the medication, will give. Pt brought his own, instructed not to take.

## 2020-03-29 NOTE — ED ADULT NURSE NOTE - OBJECTIVE STATEMENT
pt is 60y male, here for body aches, weakness, nausea, lack of apatite, fevers and cough x2 weeks, recently tested positive for covid, pt is A&Ox3, speaking in full sentences, no respiratory distress present, NAD noted

## 2020-03-29 NOTE — H&P ADULT - PROBLEM SELECTOR PLAN 3
On Toujeo at home, 1:1 to lantus, dose unknown. Pt poor PO over last week. Glu 287 on admission  - Avoid cpap/bipap in covid pts due to aersolization of virus  - monitor o2 while asleep

## 2020-03-30 ENCOUNTER — TRANSCRIPTION ENCOUNTER (OUTPATIENT)
Age: 60
End: 2020-03-30

## 2020-03-30 LAB
ALBUMIN SERPL ELPH-MCNC: 3.4 G/DL — SIGNIFICANT CHANGE UP (ref 3.3–5)
ALP SERPL-CCNC: 80 U/L — SIGNIFICANT CHANGE UP (ref 40–120)
ALT FLD-CCNC: 20 U/L — SIGNIFICANT CHANGE UP (ref 10–45)
ANION GAP SERPL CALC-SCNC: 13 MMOL/L — SIGNIFICANT CHANGE UP (ref 5–17)
AST SERPL-CCNC: 33 U/L — SIGNIFICANT CHANGE UP (ref 10–40)
BASOPHILS # BLD AUTO: 0.01 K/UL — SIGNIFICANT CHANGE UP (ref 0–0.2)
BASOPHILS NFR BLD AUTO: 0.1 % — SIGNIFICANT CHANGE UP (ref 0–2)
BILIRUB SERPL-MCNC: 0.2 MG/DL — SIGNIFICANT CHANGE UP (ref 0.2–1.2)
BUN SERPL-MCNC: 26 MG/DL — HIGH (ref 7–23)
CALCIUM SERPL-MCNC: 8.9 MG/DL — SIGNIFICANT CHANGE UP (ref 8.4–10.5)
CHLORIDE SERPL-SCNC: 101 MMOL/L — SIGNIFICANT CHANGE UP (ref 96–108)
CO2 SERPL-SCNC: 23 MMOL/L — SIGNIFICANT CHANGE UP (ref 22–31)
CREAT SERPL-MCNC: 1.35 MG/DL — HIGH (ref 0.5–1.3)
CRP SERPL-MCNC: 18.47 MG/DL — HIGH (ref 0–0.4)
D DIMER BLD IA.RAPID-MCNC: 429 NG/ML DDU — HIGH
EOSINOPHIL # BLD AUTO: 0.03 K/UL — SIGNIFICANT CHANGE UP (ref 0–0.5)
EOSINOPHIL NFR BLD AUTO: 0.3 % — SIGNIFICANT CHANGE UP (ref 0–6)
FERRITIN SERPL-MCNC: 1091 NG/ML — HIGH (ref 30–400)
GLUCOSE BLDC GLUCOMTR-MCNC: 171 MG/DL — HIGH (ref 70–99)
GLUCOSE BLDC GLUCOMTR-MCNC: 182 MG/DL — HIGH (ref 70–99)
GLUCOSE BLDC GLUCOMTR-MCNC: 200 MG/DL — HIGH (ref 70–99)
GLUCOSE BLDC GLUCOMTR-MCNC: 222 MG/DL — HIGH (ref 70–99)
GLUCOSE SERPL-MCNC: 242 MG/DL — HIGH (ref 70–99)
HBA1C BLD-MCNC: 6.5 % — HIGH (ref 4–5.6)
HCT VFR BLD CALC: 33.3 % — LOW (ref 39–50)
HCV AB S/CO SERPL IA: 0.17 S/CO — SIGNIFICANT CHANGE UP
HCV AB SERPL-IMP: SIGNIFICANT CHANGE UP
HGB BLD-MCNC: 10.9 G/DL — LOW (ref 13–17)
IMM GRANULOCYTES NFR BLD AUTO: 0.8 % — SIGNIFICANT CHANGE UP (ref 0–1.5)
LYMPHOCYTES # BLD AUTO: 0.71 K/UL — LOW (ref 1–3.3)
LYMPHOCYTES # BLD AUTO: 7.3 % — LOW (ref 13–44)
MAGNESIUM SERPL-MCNC: 2 MG/DL — SIGNIFICANT CHANGE UP (ref 1.6–2.6)
MCHC RBC-ENTMCNC: 29.2 PG — SIGNIFICANT CHANGE UP (ref 27–34)
MCHC RBC-ENTMCNC: 32.7 GM/DL — SIGNIFICANT CHANGE UP (ref 32–36)
MCV RBC AUTO: 89.3 FL — SIGNIFICANT CHANGE UP (ref 80–100)
MONOCYTES # BLD AUTO: 0.55 K/UL — SIGNIFICANT CHANGE UP (ref 0–0.9)
MONOCYTES NFR BLD AUTO: 5.7 % — SIGNIFICANT CHANGE UP (ref 2–14)
NEUTROPHILS # BLD AUTO: 8.28 K/UL — HIGH (ref 1.8–7.4)
NEUTROPHILS NFR BLD AUTO: 85.8 % — HIGH (ref 43–77)
NRBC # BLD: 0 /100 WBCS — SIGNIFICANT CHANGE UP (ref 0–0)
PHOSPHATE SERPL-MCNC: 2.4 MG/DL — LOW (ref 2.5–4.5)
PLATELET # BLD AUTO: 215 K/UL — SIGNIFICANT CHANGE UP (ref 150–400)
POTASSIUM SERPL-MCNC: 4.4 MMOL/L — SIGNIFICANT CHANGE UP (ref 3.5–5.3)
POTASSIUM SERPL-SCNC: 4.4 MMOL/L — SIGNIFICANT CHANGE UP (ref 3.5–5.3)
PROT SERPL-MCNC: 7.2 G/DL — SIGNIFICANT CHANGE UP (ref 6–8.3)
RBC # BLD: 3.73 M/UL — LOW (ref 4.2–5.8)
RBC # FLD: 13 % — SIGNIFICANT CHANGE UP (ref 10.3–14.5)
SODIUM SERPL-SCNC: 137 MMOL/L — SIGNIFICANT CHANGE UP (ref 135–145)
WBC # BLD: 9.66 K/UL — SIGNIFICANT CHANGE UP (ref 3.8–10.5)
WBC # FLD AUTO: 9.66 K/UL — SIGNIFICANT CHANGE UP (ref 3.8–10.5)

## 2020-03-30 PROCEDURE — 99232 SBSQ HOSP IP/OBS MODERATE 35: CPT | Mod: GC

## 2020-03-30 RX ORDER — HYDROXYCHLOROQUINE SULFATE 200 MG
200 TABLET ORAL EVERY 12 HOURS
Refills: 0 | Status: DISCONTINUED | OUTPATIENT
Start: 2020-03-31 | End: 2020-04-01

## 2020-03-30 RX ORDER — THIAMINE MONONITRATE (VIT B1) 100 MG
200 TABLET ORAL
Refills: 0 | Status: DISCONTINUED | OUTPATIENT
Start: 2020-03-30 | End: 2020-04-01

## 2020-03-30 RX ORDER — ASCORBIC ACID 60 MG
1500 TABLET,CHEWABLE ORAL
Refills: 0 | Status: DISCONTINUED | OUTPATIENT
Start: 2020-03-30 | End: 2020-04-01

## 2020-03-30 RX ADMIN — Medication 1500 MILLIGRAM(S): at 23:48

## 2020-03-30 RX ADMIN — Medication 153 MILLIGRAM(S): at 02:09

## 2020-03-30 RX ADMIN — Medication 102 MILLIGRAM(S): at 04:10

## 2020-03-30 RX ADMIN — Medication 2: at 23:39

## 2020-03-30 RX ADMIN — ISAVUCONAZONIUM SULFATE 2 CAPSULE(S): 40 CAPSULE ORAL at 07:21

## 2020-03-30 RX ADMIN — INSULIN GLARGINE 10 UNIT(S): 100 INJECTION, SOLUTION SUBCUTANEOUS at 23:38

## 2020-03-30 RX ADMIN — AZITHROMYCIN 250 MILLIGRAM(S): 500 TABLET, FILM COATED ORAL at 18:12

## 2020-03-30 RX ADMIN — Medication 153 MILLIGRAM(S): at 08:56

## 2020-03-30 RX ADMIN — Medication 1500 MILLIGRAM(S): at 18:11

## 2020-03-30 RX ADMIN — Medication 650 MILLIGRAM(S): at 04:39

## 2020-03-30 RX ADMIN — Medication 650 MILLIGRAM(S): at 18:11

## 2020-03-30 RX ADMIN — Medication 4: at 12:42

## 2020-03-30 RX ADMIN — Medication 2: at 08:57

## 2020-03-30 RX ADMIN — FINASTERIDE 5 MILLIGRAM(S): 5 TABLET, FILM COATED ORAL at 12:43

## 2020-03-30 RX ADMIN — Medication 2: at 18:11

## 2020-03-30 RX ADMIN — Medication 400 MILLIGRAM(S): at 18:11

## 2020-03-30 RX ADMIN — FAMOTIDINE 20 MILLIGRAM(S): 10 INJECTION INTRAVENOUS at 12:43

## 2020-03-30 RX ADMIN — Medication 153 MILLIGRAM(S): at 12:43

## 2020-03-30 RX ADMIN — Medication 650 MILLIGRAM(S): at 23:48

## 2020-03-30 RX ADMIN — Medication 200 MILLIGRAM(S): at 18:12

## 2020-03-30 RX ADMIN — Medication 650 MILLIGRAM(S): at 12:43

## 2020-03-30 RX ADMIN — Medication 400 MILLIGRAM(S): at 04:39

## 2020-03-30 RX ADMIN — Medication 650 MILLIGRAM(S): at 05:09

## 2020-03-30 NOTE — PROGRESS NOTE ADULT - PROBLEM SELECTOR PLAN 7
F: None  E: Replete PRN  N: Consistent Carb  GI PPX: Pepcid  DVT PPX: Lovenox  Code: Full  Dispo: COVID

## 2020-03-30 NOTE — PROGRESS NOTE ADULT - SUBJECTIVE AND OBJECTIVE BOX
OVERNIGHT EVENTS: None    SUBJECTIVE: Patient seen and examined at the bedside. No complaints.    Vital Signs Last 12 Hrs  T(F): 100.2 (03-30-20 @ 05:10), Max: 100.2 (03-30-20 @ 05:10)  HR: 93 (03-30-20 @ 05:10) (85 - 93)  BP: 147/72 (03-30-20 @ 05:10) (106/60 - 147/72)  BP(mean): --  RR: 18 (03-30-20 @ 05:10) (18 - 18)  SpO2: 98% (03-30-20 @ 05:10) (95% - 98%)  I&O's Summary      PHYSICAL EXAM:  General: In no acute distress, resting comfortably in bed, on room air. Difficulty hearing, best on right side.  HEENT: NCAT, PERRL, EOMI, MMM  Neck: No JVD  Respiratory: Clear to auscultation bilaterally with no wheezes, rales, or rhonchi  Cardiovascular: RRR, normal S1 and S2  Vascular: 2+ radial and DP pulses  Abdomen: Soft, NT/ND.  Extremities: Warm and well perfused.  Skin: No gross skin abnormalities or rashes  Neuro: AAOx3. Strength and sensation intact throughout.    LABS:                        10.9   9.66  )-----------( 215      ( 30 Mar 2020 10:11 )             33.3     03-29    131<L>  |  94<L>  |  28<H>  ----------------------------<  287<H>  4.8   |  22  |  1.56<H>    Ca    9.0      29 Mar 2020 15:04    TPro  7.3  /  Alb  3.4  /  TBili  0.3  /  DBili  x   /  AST  26  /  ALT  14  /  AlkPhos  85  03-29    PT/INR - ( 29 Mar 2020 15:04 )   PT: 14.8 sec;   INR: 1.29          PTT - ( 29 Mar 2020 15:04 )  PTT:29.1 sec      RADIOLOGY & ADDITIONAL TESTS: Reviewed.    MEDICATIONS  (STANDING):  acetaminophen   Tablet .. 650 milliGRAM(s) Oral every 6 hours  ascorbic acid IVPB 1500 milliGRAM(s) IV Intermittent every 6 hours  azithromycin   Tablet 250 milliGRAM(s) Oral every 24 hours  dextrose 5%. 1000 milliLiter(s) (50 mL/Hr) IV Continuous <Continuous>  dextrose 50% Injectable 12.5 Gram(s) IV Push once  dextrose 50% Injectable 25 Gram(s) IV Push once  dextrose 50% Injectable 25 Gram(s) IV Push once  enoxaparin Injectable 40 milliGRAM(s) SubCutaneous every 24 hours  famotidine    Tablet 20 milliGRAM(s) Oral daily  finasteride 5 milliGRAM(s) Oral daily  hydroxychloroquine 400 milliGRAM(s) Oral every 12 hours  hydroxychloroquine 200 milliGRAM(s) Oral every 12 hours  insulin glargine Injectable (LANTUS) 10 Unit(s) SubCutaneous at bedtime  insulin lispro (HumaLOG) corrective regimen sliding scale   SubCutaneous three times a day before meals  isavuconazonium sulfate 186 milliGRAM(s) 2 Capsule(s) Oral every 24 hours  thiamine IVPB 200 milliGRAM(s) IV Intermittent <User Schedule>    MEDICATIONS  (PRN):  dextrose 40% Gel 15 Gram(s) Oral once PRN Blood Glucose LESS THAN 70 milliGRAM(s)/deciliter  glucagon  Injectable 1 milliGRAM(s) IntraMuscular once PRN Glucose LESS THAN 70 milligrams/deciliter      Allergies    No Known Allergies    Intolerances

## 2020-03-30 NOTE — DISCHARGE NOTE PROVIDER - NSDCCPCAREPLAN_GEN_ALL_CORE_FT
PRINCIPAL DISCHARGE DIAGNOSIS  Diagnosis: Infection due to 2019 novel coronavirus  Assessment and Plan of Treatment: You were previously tested for COVID-19 and positive. You were sent home with instructions to quarantine. You did not feel better and came to the emergeny department. You were started on treatment for COVID-19 with Azithromycin and Plaquenil. You were re-swabbed and tested negative. You are being discharged with enough Plaquenil and Azithromycin to complete a 5 day course of each.  Please continue practicing social distancing, good hand hygiene, and wear a mask when possible. Please continue to quarantine as well.

## 2020-03-30 NOTE — DISCHARGE NOTE PROVIDER - NSDCMRMEDTOKEN_GEN_ALL_CORE_FT
Cresemba 186 mg oral capsule: 2 cap(s) orally once a day  finasteride 5 mg oral tablet: 1 tab(s) orally once a day  Toujeo SoloStar: azithromycin 250 mg oral tablet: 1 tab(s) orally every 24 hours MDD:1 tab    Dose for 3/31 due at 6PM  Cresemba 186 mg oral capsule: 2 cap(s) orally once a day  finasteride 5 mg oral tablet: 1 tab(s) orally once a day  hydroxychloroquine 200 mg oral tablet: 1 tab(s) orally every 12 hours MDD:2 tabs.    First dose after discharge is due at 6PM 3/31  Shara Norwood: azithromycin 250 mg oral tablet: 1 tab(s) orally every 24 hours     :first dose due after discharge is at 6PM  Cresemba 186 mg oral capsule: 2 cap(s) orally once a day  finasteride 5 mg oral tablet: 1 tab(s) orally once a day  hydroxychloroquine 200 mg oral tablet: 1 tab(s) orally every 12 hours    MDD:First dose at discharge due at 6PM  Shara Norwood:

## 2020-03-30 NOTE — PROGRESS NOTE ADULT - PROBLEM SELECTOR PLAN 3
History of DEON on CPAP    -No CPAP/BiPAP given COVID-19 aerosolization risk  -NC at bedtime if needed

## 2020-03-30 NOTE — PROGRESS NOTE ADULT - ASSESSMENT
60-year-old male with a past medical history of DM, BPH, skull fungal osteomyelitis, deafness, and DEON who presented with a 5-day history of fevers, cough, and progressive dyspnea in the setting of a known COVID-19 positive infection. Admitted for hypoxic respiratory failure.

## 2020-03-30 NOTE — DISCHARGE NOTE PROVIDER - HOSPITAL COURSE
#Discharge: do not delete        60-year-old male with a past medical history of DM, BPH, skull fungal osteomyelitis, deafness, and DEON who presented with a 5-day history of fevers, cough, weakness, and progressive dyspnea in the setting of a known COVID-19 positive infection. Admitted for further management.        Problem List/Main Diagnoses (system-based):         1. COVID-19 - Treated with Azithromycin and Plaquenil with the addition of Thiamine and Ascorbic Acid    2. SANDY - Improved overnight after increase PO intake    3. DEON - No CPAP with known COVID-19 infection    4. Osteomyelitis - Continue home Cresemba    5. BPH - Continued home Finasteride        Inpatient treatment course: Azithromycin, Plaquenil, Thiamine, Vitamin C    New medications: Plaquenil and Azithromycin    Labs to be followed outpatient: None    Exam to be followed outpatient: None #Discharge: do not delete        60-year-old male with a past medical history of DM, BPH, skull fungal osteomyelitis, deafness, and DEON who presented with a 5-day history of fevers, cough, weakness, and progressive dyspnea in the setting of a known COVID-19 positive infection. Admitted for further management.        Problem List/Main Diagnoses (system-based):         1. Viral Pneumonia - From COVID-19. Treated with Azithromycin and Plaquenil.    2. COVID-19 - Treated with Azithromycin and Plaquenil with the addition of Thiamine and Ascorbic Acid    3. SANDY - Improved overnight after increase PO intake    4. DEON - No CPAP with known COVID-19 infection    5. Osteomyelitis - Continue home Cresemba    6. BPH - Continued home Finasteride        Inpatient treatment course: Azithromycin, Plaquenil, Thiamine, Vitamin C    New medications: Plaquenil and Azithromycin    Labs to be followed outpatient: None    Exam to be followed outpatient: None

## 2020-03-30 NOTE — PROGRESS NOTE ADULT - PROBLEM SELECTOR PLAN 1
Known positive COVID-19. Presented to ED with dyspnea. Reportedly hypoxic on 3L. Swabbed and negative in the ED. Not suspecting superimposed bacterial pneumonia given low procalcitonin and lack of leukocytosis.    -Monitor oxygen saturation  -Tylenol PRN  -Daily CBC/CMP/Mg/PO4/CRP/Ferritin/D-Dimer  -COVID PCR status - [] received [X] resulted negative 3/29  -Azithromycin 250mg daily x5 days (started 3/29)  -Hydroxychloroquine 400mg BID x2 doses -> 200mg BID x 4 days (started 3/29)  -Ascorbic Acid 1.5mg Q6H x 5 days  -Thiamine 200mg BID x 5 days   -G6PD status - [] collected, [] received, [] resulted [x] none  -QuantiFeron status - [] collected, [] received, [] resulted [x] none  -T cell subset status - [] collected, [] received, [] resulted [x] none  -Immunoglobulins status - [] collected, [] received, [] resulted [x] none  -Triglycerides status - [] collected, [] received, [] resulted [x] none

## 2020-03-30 NOTE — PROGRESS NOTE ADULT - PROBLEM SELECTOR PLAN 2
Admission creatinine 1.56 in the setting of low PO intake for 1 week. Suspect pre-renal etiology. Received 500cc IVF.    -Urine electrolytes pending as of 3/30  -Encourage PO intake  -Monitor creatinine

## 2020-03-30 NOTE — PROGRESS NOTE ADULT - PROBLEM SELECTOR PLAN 4
History of Toujeo at home. On Lantus at home as well. Admission .    -Continue Lantus 10U QHS  -MISS while inpatient

## 2020-03-31 DIAGNOSIS — J12.9 VIRAL PNEUMONIA, UNSPECIFIED: ICD-10-CM

## 2020-03-31 LAB
ALBUMIN SERPL ELPH-MCNC: 3.1 G/DL — LOW (ref 3.3–5)
ALP SERPL-CCNC: 77 U/L — SIGNIFICANT CHANGE UP (ref 40–120)
ALT FLD-CCNC: 19 U/L — SIGNIFICANT CHANGE UP (ref 10–45)
ANION GAP SERPL CALC-SCNC: 13 MMOL/L — SIGNIFICANT CHANGE UP (ref 5–17)
AST SERPL-CCNC: 32 U/L — SIGNIFICANT CHANGE UP (ref 10–40)
BASOPHILS # BLD AUTO: 0.02 K/UL — SIGNIFICANT CHANGE UP (ref 0–0.2)
BASOPHILS NFR BLD AUTO: 0.2 % — SIGNIFICANT CHANGE UP (ref 0–2)
BILIRUB SERPL-MCNC: 0.2 MG/DL — SIGNIFICANT CHANGE UP (ref 0.2–1.2)
BUN SERPL-MCNC: 24 MG/DL — HIGH (ref 7–23)
CALCIUM SERPL-MCNC: 9.3 MG/DL — SIGNIFICANT CHANGE UP (ref 8.4–10.5)
CHLORIDE SERPL-SCNC: 102 MMOL/L — SIGNIFICANT CHANGE UP (ref 96–108)
CO2 SERPL-SCNC: 22 MMOL/L — SIGNIFICANT CHANGE UP (ref 22–31)
CREAT SERPL-MCNC: 1.33 MG/DL — HIGH (ref 0.5–1.3)
CRP SERPL-MCNC: 15.78 MG/DL — HIGH (ref 0–0.4)
D DIMER BLD IA.RAPID-MCNC: 422 NG/ML DDU — HIGH
EOSINOPHIL # BLD AUTO: 0.12 K/UL — SIGNIFICANT CHANGE UP (ref 0–0.5)
EOSINOPHIL NFR BLD AUTO: 1.3 % — SIGNIFICANT CHANGE UP (ref 0–6)
FERRITIN SERPL-MCNC: 887 NG/ML — HIGH (ref 30–400)
GAMMA INTERFERON BACKGROUND BLD IA-ACNC: 0.09 IU/ML — SIGNIFICANT CHANGE UP
GLUCOSE BLDC GLUCOMTR-MCNC: 137 MG/DL — HIGH (ref 70–99)
GLUCOSE BLDC GLUCOMTR-MCNC: 157 MG/DL — HIGH (ref 70–99)
GLUCOSE BLDC GLUCOMTR-MCNC: 169 MG/DL — HIGH (ref 70–99)
GLUCOSE BLDC GLUCOMTR-MCNC: 175 MG/DL — HIGH (ref 70–99)
GLUCOSE SERPL-MCNC: 147 MG/DL — HIGH (ref 70–99)
HCT VFR BLD CALC: 31.9 % — LOW (ref 39–50)
HGB BLD-MCNC: 10.2 G/DL — LOW (ref 13–17)
IMM GRANULOCYTES NFR BLD AUTO: 0.9 % — SIGNIFICANT CHANGE UP (ref 0–1.5)
LYMPHOCYTES # BLD AUTO: 1.01 K/UL — SIGNIFICANT CHANGE UP (ref 1–3.3)
LYMPHOCYTES # BLD AUTO: 10.9 % — LOW (ref 13–44)
M TB IFN-G BLD-IMP: ABNORMAL
M TB IFN-G CD4+ BCKGRND COR BLD-ACNC: 0.01 IU/ML — SIGNIFICANT CHANGE UP
M TB IFN-G CD4+CD8+ BCKGRND COR BLD-ACNC: 0.01 IU/ML — SIGNIFICANT CHANGE UP
MAGNESIUM SERPL-MCNC: 2 MG/DL — SIGNIFICANT CHANGE UP (ref 1.6–2.6)
MCHC RBC-ENTMCNC: 28.7 PG — SIGNIFICANT CHANGE UP (ref 27–34)
MCHC RBC-ENTMCNC: 32 GM/DL — SIGNIFICANT CHANGE UP (ref 32–36)
MCV RBC AUTO: 89.9 FL — SIGNIFICANT CHANGE UP (ref 80–100)
MONOCYTES # BLD AUTO: 0.65 K/UL — SIGNIFICANT CHANGE UP (ref 0–0.9)
MONOCYTES NFR BLD AUTO: 7 % — SIGNIFICANT CHANGE UP (ref 2–14)
NEUTROPHILS # BLD AUTO: 7.41 K/UL — HIGH (ref 1.8–7.4)
NEUTROPHILS NFR BLD AUTO: 79.7 % — HIGH (ref 43–77)
NRBC # BLD: 0 /100 WBCS — SIGNIFICANT CHANGE UP (ref 0–0)
PHOSPHATE SERPL-MCNC: 3 MG/DL — SIGNIFICANT CHANGE UP (ref 2.5–4.5)
PLATELET # BLD AUTO: 251 K/UL — SIGNIFICANT CHANGE UP (ref 150–400)
POTASSIUM SERPL-MCNC: 4.1 MMOL/L — SIGNIFICANT CHANGE UP (ref 3.5–5.3)
POTASSIUM SERPL-SCNC: 4.1 MMOL/L — SIGNIFICANT CHANGE UP (ref 3.5–5.3)
PROT SERPL-MCNC: 7 G/DL — SIGNIFICANT CHANGE UP (ref 6–8.3)
QUANT TB PLUS MITOGEN MINUS NIL: 0.37 IU/ML — SIGNIFICANT CHANGE UP
RBC # BLD: 3.55 M/UL — LOW (ref 4.2–5.8)
RBC # FLD: 13.1 % — SIGNIFICANT CHANGE UP (ref 10.3–14.5)
SODIUM SERPL-SCNC: 137 MMOL/L — SIGNIFICANT CHANGE UP (ref 135–145)
WBC # BLD: 9.29 K/UL — SIGNIFICANT CHANGE UP (ref 3.8–10.5)
WBC # FLD AUTO: 9.29 K/UL — SIGNIFICANT CHANGE UP (ref 3.8–10.5)

## 2020-03-31 PROCEDURE — 99232 SBSQ HOSP IP/OBS MODERATE 35: CPT | Mod: GC

## 2020-03-31 RX ORDER — AZITHROMYCIN 500 MG/1
1 TABLET, FILM COATED ORAL
Qty: 4 | Refills: 0
Start: 2020-03-31 | End: 2020-04-03

## 2020-03-31 RX ORDER — HYDROXYCHLOROQUINE SULFATE 200 MG
1 TABLET ORAL
Qty: 7 | Refills: 0
Start: 2020-03-31 | End: 2020-04-03

## 2020-03-31 RX ADMIN — Medication 2: at 22:01

## 2020-03-31 RX ADMIN — FAMOTIDINE 20 MILLIGRAM(S): 10 INJECTION INTRAVENOUS at 13:18

## 2020-03-31 RX ADMIN — Medication 650 MILLIGRAM(S): at 06:36

## 2020-03-31 RX ADMIN — Medication 200 MILLIGRAM(S): at 06:36

## 2020-03-31 RX ADMIN — FINASTERIDE 5 MILLIGRAM(S): 5 TABLET, FILM COATED ORAL at 13:18

## 2020-03-31 RX ADMIN — Medication 2: at 17:56

## 2020-03-31 RX ADMIN — ISAVUCONAZONIUM SULFATE 2 CAPSULE(S): 40 CAPSULE ORAL at 07:16

## 2020-03-31 RX ADMIN — Medication 2: at 13:16

## 2020-03-31 RX ADMIN — Medication 1500 MILLIGRAM(S): at 06:36

## 2020-03-31 RX ADMIN — Medication 200 MILLIGRAM(S): at 18:17

## 2020-03-31 RX ADMIN — Medication 1500 MILLIGRAM(S): at 22:01

## 2020-03-31 RX ADMIN — AZITHROMYCIN 250 MILLIGRAM(S): 500 TABLET, FILM COATED ORAL at 18:17

## 2020-03-31 RX ADMIN — Medication 1500 MILLIGRAM(S): at 18:17

## 2020-03-31 RX ADMIN — Medication 1500 MILLIGRAM(S): at 13:18

## 2020-03-31 RX ADMIN — Medication 650 MILLIGRAM(S): at 22:01

## 2020-03-31 RX ADMIN — Medication 650 MILLIGRAM(S): at 18:17

## 2020-03-31 RX ADMIN — Medication 650 MILLIGRAM(S): at 13:17

## 2020-03-31 RX ADMIN — INSULIN GLARGINE 10 UNIT(S): 100 INJECTION, SOLUTION SUBCUTANEOUS at 22:01

## 2020-03-31 NOTE — PROGRESS NOTE ADULT - PROBLEM SELECTOR PLAN 4
History of Toujeo at home. On Lantus at home as well. Admission .    -Continue Lantus 10U QHS  -MISS while inpatient History of DEON on CPAP    -No CPAP/BiPAP given COVID-19 aerosolization risk  -NC at bedtime if needed

## 2020-03-31 NOTE — PHYSICAL THERAPY INITIAL EVALUATION ADULT - CRITERIA FOR SKILLED THERAPEUTIC INTERVENTIONS
impairments found/functional limitations in following categories/risk reduction/prevention/anticipated discharge recommendation/therapy frequency/rehab potential/anticipated equipment needs at discharge

## 2020-03-31 NOTE — PROGRESS NOTE ADULT - PROBLEM SELECTOR PLAN 5
History of fungal osteomyelitis on Cresemba 2 caps QD.    -Continue Cresemba QD History of Toujeo at home. On Lantus at home as well. Admission .    -Continue Lantus 10U QHS  -MISS while inpatient

## 2020-03-31 NOTE — PHYSICAL THERAPY INITIAL EVALUATION ADULT - ADDITIONAL COMMENTS
Pt reports living with his wife and child in a 2nd floor walk up apartment. Pt reports being completely independent with all ADL's and functional mobility without use of an assistive device.

## 2020-03-31 NOTE — PROGRESS NOTE ADULT - PROBLEM SELECTOR PLAN 1
Known positive COVID-19. Presented to ED with dyspnea. Reportedly hypoxic on 3L. Swabbed and negative in the ED. Not suspecting superimposed bacterial pneumonia given low procalcitonin and lack of leukocytosis.    -Saturating well on room air today 3/31  -Tylenol PRN  -CRP = 20.70 -> 18.47 -> 15.78   -Ferritin = 999 -> 1091 -> 887   -D-Dimer = 418 -> 429 -> 422   -Daily CBC/CMP/Mg/PO4/CRP/Ferritin/D-Dimer  -COVID PCR status - [] received [X] resulted negative 3/29  -Azithromycin 250mg daily x5 days (started 3/29)  -Hydroxychloroquine 400mg BID x2 doses -> 200mg BID x 4 days (started 3/29)  -Ascorbic Acid 1.5mg Q6H x 5 days  -Thiamine 200mg BID x 5 days   -G6PD status - [] collected, [] received, [] resulted [x] none  -QuantiFeron status - [] collected, [] received, [] resulted [x] none  -T cell subset status - [] collected, [] received, [] resulted [x] none  -Immunoglobulins status - [] collected, [] received, [] resulted [x] none  -Triglycerides status - [] collected, [] received, [] resulted [x] none Secondary to COVID-19.     -Treatment as below

## 2020-03-31 NOTE — PHYSICAL THERAPY INITIAL EVALUATION ADULT - GENERAL OBSERVATIONS, REHAB EVAL
Pt found semi supine in bed in NAD, on RA, +hep lock, agreeable to PT Eval and cleared by HECTOR Newsome.

## 2020-03-31 NOTE — PHYSICAL THERAPY INITIAL EVALUATION ADULT - PLANNED THERAPY INTERVENTIONS, PT EVAL
motor coordination training/transfer training/bed mobility training/balance training/strengthening/gait training/postural re-education/neuromuscular re-education

## 2020-03-31 NOTE — PROGRESS NOTE ADULT - PROBLEM SELECTOR PLAN 6
History of BPH on Finasteride 5mg QD    -Continue Finasteride 5mg QD History of fungal osteomyelitis on Cresemba 2 caps QD.    -Continue Cresemba QD

## 2020-03-31 NOTE — PHYSICAL THERAPY INITIAL EVALUATION ADULT - IMPAIRMENTS FOUND, PT EVAL
gait, locomotion, and balance/posture/decreased midline orientation/ventilation and respiration/gas exchange/muscle strength/aerobic capacity/endurance

## 2020-03-31 NOTE — PROGRESS NOTE ADULT - PROBLEM SELECTOR PLAN 2
Admission creatinine 1.56 in the setting of low PO intake for 1 week. Suspect pre-renal etiology. Received 500cc IVF.    -Creatinine 1.33 today 3/31  -Monitor creatinine Known positive COVID-19. Presented to ED with dyspnea. Reportedly hypoxic on 3L. Swabbed and negative in the ED. Not suspecting superimposed bacterial pneumonia given low procalcitonin and lack of leukocytosis.    -Saturating well on room air today 3/31  -Tylenol PRN  -CRP = 20.70 -> 18.47 -> 15.78   -Ferritin = 999 -> 1091 -> 887   -D-Dimer = 418 -> 429 -> 422   -Daily CBC/CMP/Mg/PO4/CRP/Ferritin/D-Dimer  -COVID PCR status - [] received [X] resulted negative 3/29  -Azithromycin 250mg daily x5 days (started 3/29)  -Hydroxychloroquine 400mg BID x2 doses -> 200mg BID x 4 days (started 3/29)  -Ascorbic Acid 1.5mg Q6H x 5 days  -Thiamine 200mg BID x 5 days   -G6PD status - [] collected, [] received, [] resulted [x] none  -QuantiFeron status - [] collected, [] received, [] resulted [x] none  -T cell subset status - [] collected, [] received, [] resulted [x] none  -Immunoglobulins status - [] collected, [] received, [] resulted [x] none  -Triglycerides status - [] collected, [] received, [] resulted [x] none

## 2020-03-31 NOTE — PROGRESS NOTE ADULT - PROBLEM SELECTOR PLAN 7
F: None  E: Replete PRN  N: Consistent Carb  GI PPX: Pepcid  DVT PPX: Lovenox  Code: Full  Dispo: Home vs BLAIR pending PT recommendations History of BPH on Finasteride 5mg QD    -Continue Finasteride 5mg QD

## 2020-03-31 NOTE — PROGRESS NOTE ADULT - PROBLEM SELECTOR PLAN 3
History of DEON on CPAP    -No CPAP/BiPAP given COVID-19 aerosolization risk  -NC at bedtime if needed Admission creatinine 1.56 in the setting of low PO intake for 1 week. Suspect pre-renal etiology. Received 500cc IVF.    -Creatinine 1.33 today 3/31  -Monitor creatinine

## 2020-03-31 NOTE — PROGRESS NOTE ADULT - SUBJECTIVE AND OBJECTIVE BOX
OVERNIGHT EVENTS: None    SUBJECTIVE: Patient seen and examined at the bedside. He says his breathing feels good but he is weak and not eating much except for fruit    Vital Signs Last 12 Hrs  T(F): 97.7 (03-31-20 @ 06:29), Max: 98.6 (03-30-20 @ 23:30)  HR: 70 (03-31-20 @ 06:29) (70 - 86)  BP: 133/75 (03-31-20 @ 06:29) (133/75 - 135/82)  BP(mean): --  RR: 16 (03-31-20 @ 06:29) (16 - 16)  SpO2: 98% (03-31-20 @ 06:29) (96% - 98%)  I&O's Summary      PHYSICAL EXAM:  General: In no acute distress, resting comfortably in bed on room air. Difficulty hearing.  HEENT: NCAT, PERRL, EOMI, MMM  Neck: No JVD  Respiratory: Clear to auscultation bilaterally   Cardiovascular: RRR, normal S1 and S2  Vascular: 2+ radial and DP pulses  Abdomen: Soft, NT/ND.  Extremities: Warm and well perfused.  Skin: No gross skin abnormalities or rashes  Neuro: AAOx3. Strength and sensation intact throughout.    LABS:                        10.2   9.29  )-----------( 251      ( 31 Mar 2020 07:06 )             31.9     03-31    137  |  102  |  24<H>  ----------------------------<  147<H>  4.1   |  22  |  1.33<H>    Ca    9.3      31 Mar 2020 07:06  Phos  3.0     03-31  Mg     2.0     03-31    TPro  7.0  /  Alb  3.1<L>  /  TBili  0.2  /  DBili  x   /  AST  32  /  ALT  19  /  AlkPhos  77  03-31    PT/INR - ( 29 Mar 2020 15:04 )   PT: 14.8 sec;   INR: 1.29          PTT - ( 29 Mar 2020 15:04 )  PTT:29.1 sec      RADIOLOGY & ADDITIONAL TESTS: Reviewed.    MEDICATIONS  (STANDING):  acetaminophen   Tablet .. 650 milliGRAM(s) Oral every 6 hours  ascorbic acid 1500 milliGRAM(s) Oral four times a day  azithromycin   Tablet 250 milliGRAM(s) Oral every 24 hours  dextrose 5%. 1000 milliLiter(s) (50 mL/Hr) IV Continuous <Continuous>  dextrose 50% Injectable 12.5 Gram(s) IV Push once  dextrose 50% Injectable 25 Gram(s) IV Push once  dextrose 50% Injectable 25 Gram(s) IV Push once  enoxaparin Injectable 40 milliGRAM(s) SubCutaneous every 24 hours  famotidine    Tablet 20 milliGRAM(s) Oral daily  finasteride 5 milliGRAM(s) Oral daily  hydroxychloroquine 200 milliGRAM(s) Oral every 12 hours  insulin glargine Injectable (LANTUS) 10 Unit(s) SubCutaneous at bedtime  insulin lispro (HumaLOG) corrective regimen sliding scale   SubCutaneous Before meals and at bedtime  isavuconazonium sulfate 186 milliGRAM(s) 2 Capsule(s) Oral every 24 hours  thiamine 200 milliGRAM(s) Oral <User Schedule>    MEDICATIONS  (PRN):  dextrose 40% Gel 15 Gram(s) Oral once PRN Blood Glucose LESS THAN 70 milliGRAM(s)/deciliter  glucagon  Injectable 1 milliGRAM(s) IntraMuscular once PRN Glucose LESS THAN 70 milligrams/deciliter      Allergies    No Known Allergies    Intolerances

## 2020-04-01 ENCOUNTER — TRANSCRIPTION ENCOUNTER (OUTPATIENT)
Age: 60
End: 2020-04-01

## 2020-04-01 VITALS
DIASTOLIC BLOOD PRESSURE: 66 MMHG | TEMPERATURE: 98 F | OXYGEN SATURATION: 98 % | RESPIRATION RATE: 16 BRPM | SYSTOLIC BLOOD PRESSURE: 112 MMHG | HEART RATE: 77 BPM

## 2020-04-01 LAB
ALBUMIN SERPL ELPH-MCNC: 3 G/DL — LOW (ref 3.3–5)
ALP SERPL-CCNC: 71 U/L — SIGNIFICANT CHANGE UP (ref 40–120)
ALT FLD-CCNC: 14 U/L — SIGNIFICANT CHANGE UP (ref 10–45)
ANION GAP SERPL CALC-SCNC: 11 MMOL/L — SIGNIFICANT CHANGE UP (ref 5–17)
AST SERPL-CCNC: 14 U/L — SIGNIFICANT CHANGE UP (ref 10–40)
BASOPHILS # BLD AUTO: 0.01 K/UL — SIGNIFICANT CHANGE UP (ref 0–0.2)
BASOPHILS NFR BLD AUTO: 0.1 % — SIGNIFICANT CHANGE UP (ref 0–2)
BILIRUB SERPL-MCNC: 0.2 MG/DL — SIGNIFICANT CHANGE UP (ref 0.2–1.2)
BUN SERPL-MCNC: 21 MG/DL — SIGNIFICANT CHANGE UP (ref 7–23)
CALCIUM SERPL-MCNC: 9 MG/DL — SIGNIFICANT CHANGE UP (ref 8.4–10.5)
CHLORIDE SERPL-SCNC: 102 MMOL/L — SIGNIFICANT CHANGE UP (ref 96–108)
CO2 SERPL-SCNC: 23 MMOL/L — SIGNIFICANT CHANGE UP (ref 22–31)
CREAT SERPL-MCNC: 1.34 MG/DL — HIGH (ref 0.5–1.3)
CRP SERPL-MCNC: 17.37 MG/DL — HIGH (ref 0–0.4)
D DIMER BLD IA.RAPID-MCNC: 313 NG/ML DDU — HIGH
EOSINOPHIL # BLD AUTO: 0.18 K/UL — SIGNIFICANT CHANGE UP (ref 0–0.5)
EOSINOPHIL NFR BLD AUTO: 1.6 % — SIGNIFICANT CHANGE UP (ref 0–6)
FERRITIN SERPL-MCNC: 865 NG/ML — HIGH (ref 30–400)
G6PD RBC-CCNC: 14.1 U/G HGB — SIGNIFICANT CHANGE UP (ref 7–20.5)
GLUCOSE BLDC GLUCOMTR-MCNC: 101 MG/DL — HIGH (ref 70–99)
GLUCOSE BLDC GLUCOMTR-MCNC: 126 MG/DL — HIGH (ref 70–99)
GLUCOSE SERPL-MCNC: 99 MG/DL — SIGNIFICANT CHANGE UP (ref 70–99)
HCT VFR BLD CALC: 32.3 % — LOW (ref 39–50)
HGB BLD-MCNC: 10.6 G/DL — LOW (ref 13–17)
IMM GRANULOCYTES NFR BLD AUTO: 0.9 % — SIGNIFICANT CHANGE UP (ref 0–1.5)
LYMPHOCYTES # BLD AUTO: 1.14 K/UL — SIGNIFICANT CHANGE UP (ref 1–3.3)
LYMPHOCYTES # BLD AUTO: 10.4 % — LOW (ref 13–44)
MAGNESIUM SERPL-MCNC: 2 MG/DL — SIGNIFICANT CHANGE UP (ref 1.6–2.6)
MCHC RBC-ENTMCNC: 29 PG — SIGNIFICANT CHANGE UP (ref 27–34)
MCHC RBC-ENTMCNC: 32.8 GM/DL — SIGNIFICANT CHANGE UP (ref 32–36)
MCV RBC AUTO: 88.5 FL — SIGNIFICANT CHANGE UP (ref 80–100)
MONOCYTES # BLD AUTO: 0.89 K/UL — SIGNIFICANT CHANGE UP (ref 0–0.9)
MONOCYTES NFR BLD AUTO: 8.1 % — SIGNIFICANT CHANGE UP (ref 2–14)
NEUTROPHILS # BLD AUTO: 8.64 K/UL — HIGH (ref 1.8–7.4)
NEUTROPHILS NFR BLD AUTO: 78.9 % — HIGH (ref 43–77)
NRBC # BLD: 0 /100 WBCS — SIGNIFICANT CHANGE UP (ref 0–0)
PHOSPHATE SERPL-MCNC: 2.8 MG/DL — SIGNIFICANT CHANGE UP (ref 2.5–4.5)
PLATELET # BLD AUTO: 278 K/UL — SIGNIFICANT CHANGE UP (ref 150–400)
POTASSIUM SERPL-MCNC: 4.4 MMOL/L — SIGNIFICANT CHANGE UP (ref 3.5–5.3)
POTASSIUM SERPL-SCNC: 4.4 MMOL/L — SIGNIFICANT CHANGE UP (ref 3.5–5.3)
PROT SERPL-MCNC: 7 G/DL — SIGNIFICANT CHANGE UP (ref 6–8.3)
RBC # BLD: 3.65 M/UL — LOW (ref 4.2–5.8)
RBC # FLD: 13.1 % — SIGNIFICANT CHANGE UP (ref 10.3–14.5)
SODIUM SERPL-SCNC: 136 MMOL/L — SIGNIFICANT CHANGE UP (ref 135–145)
WBC # BLD: 10.96 K/UL — HIGH (ref 3.8–10.5)
WBC # FLD AUTO: 10.96 K/UL — HIGH (ref 3.8–10.5)

## 2020-04-01 PROCEDURE — 99285 EMERGENCY DEPT VISIT HI MDM: CPT | Mod: 25

## 2020-04-01 PROCEDURE — 82962 GLUCOSE BLOOD TEST: CPT

## 2020-04-01 PROCEDURE — 84145 PROCALCITONIN (PCT): CPT

## 2020-04-01 PROCEDURE — 80053 COMPREHEN METABOLIC PANEL: CPT

## 2020-04-01 PROCEDURE — 93010 ELECTROCARDIOGRAM REPORT: CPT

## 2020-04-01 PROCEDURE — 86803 HEPATITIS C AB TEST: CPT

## 2020-04-01 PROCEDURE — 97530 THERAPEUTIC ACTIVITIES: CPT

## 2020-04-01 PROCEDURE — 84484 ASSAY OF TROPONIN QUANT: CPT

## 2020-04-01 PROCEDURE — 83605 ASSAY OF LACTIC ACID: CPT

## 2020-04-01 PROCEDURE — 97161 PT EVAL LOW COMPLEX 20 MIN: CPT

## 2020-04-01 PROCEDURE — 82728 ASSAY OF FERRITIN: CPT

## 2020-04-01 PROCEDURE — 83880 ASSAY OF NATRIURETIC PEPTIDE: CPT

## 2020-04-01 PROCEDURE — 36415 COLL VENOUS BLD VENIPUNCTURE: CPT

## 2020-04-01 PROCEDURE — 97116 GAIT TRAINING THERAPY: CPT

## 2020-04-01 PROCEDURE — 96374 THER/PROPH/DIAG INJ IV PUSH: CPT

## 2020-04-01 PROCEDURE — 83735 ASSAY OF MAGNESIUM: CPT

## 2020-04-01 PROCEDURE — 93005 ELECTROCARDIOGRAM TRACING: CPT

## 2020-04-01 PROCEDURE — 87040 BLOOD CULTURE FOR BACTERIA: CPT

## 2020-04-01 PROCEDURE — 85025 COMPLETE CBC W/AUTO DIFF WBC: CPT

## 2020-04-01 PROCEDURE — 86480 TB TEST CELL IMMUN MEASURE: CPT

## 2020-04-01 PROCEDURE — 85610 PROTHROMBIN TIME: CPT

## 2020-04-01 PROCEDURE — 84100 ASSAY OF PHOSPHORUS: CPT

## 2020-04-01 PROCEDURE — 71045 X-RAY EXAM CHEST 1 VIEW: CPT

## 2020-04-01 PROCEDURE — 83036 HEMOGLOBIN GLYCOSYLATED A1C: CPT

## 2020-04-01 PROCEDURE — 96375 TX/PRO/DX INJ NEW DRUG ADDON: CPT

## 2020-04-01 PROCEDURE — 85730 THROMBOPLASTIN TIME PARTIAL: CPT

## 2020-04-01 PROCEDURE — 86140 C-REACTIVE PROTEIN: CPT

## 2020-04-01 PROCEDURE — 99238 HOSP IP/OBS DSCHRG MGMT 30/<: CPT | Mod: GC

## 2020-04-01 PROCEDURE — 87635 SARS-COV-2 COVID-19 AMP PRB: CPT

## 2020-04-01 PROCEDURE — 85379 FIBRIN DEGRADATION QUANT: CPT

## 2020-04-01 PROCEDURE — 82955 ASSAY OF G6PD ENZYME: CPT

## 2020-04-01 RX ORDER — HYDROXYCHLOROQUINE SULFATE 200 MG
1 TABLET ORAL
Qty: 5 | Refills: 0
Start: 2020-04-01 | End: 2020-04-03

## 2020-04-01 RX ORDER — AZITHROMYCIN 500 MG/1
1 TABLET, FILM COATED ORAL
Qty: 2 | Refills: 0
Start: 2020-04-01 | End: 2020-04-02

## 2020-04-01 RX ADMIN — Medication 1500 MILLIGRAM(S): at 06:27

## 2020-04-01 RX ADMIN — ISAVUCONAZONIUM SULFATE 2 CAPSULE(S): 40 CAPSULE ORAL at 06:27

## 2020-04-01 RX ADMIN — Medication 200 MILLIGRAM(S): at 06:26

## 2020-04-01 RX ADMIN — FAMOTIDINE 20 MILLIGRAM(S): 10 INJECTION INTRAVENOUS at 11:28

## 2020-04-01 RX ADMIN — Medication 650 MILLIGRAM(S): at 06:27

## 2020-04-01 RX ADMIN — FINASTERIDE 5 MILLIGRAM(S): 5 TABLET, FILM COATED ORAL at 11:28

## 2020-04-01 RX ADMIN — ENOXAPARIN SODIUM 40 MILLIGRAM(S): 100 INJECTION SUBCUTANEOUS at 11:28

## 2020-04-01 RX ADMIN — Medication 1500 MILLIGRAM(S): at 11:27

## 2020-04-01 RX ADMIN — Medication 200 MILLIGRAM(S): at 06:27

## 2020-04-01 NOTE — PROGRESS NOTE ADULT - PROBLEM SELECTOR PLAN 2
Known positive COVID-19. Presented to ED with dyspnea. Reportedly hypoxic on 3L. Swabbed and negative in the ED. Not suspecting superimposed bacterial pneumonia given low procalcitonin and lack of leukocytosis.    -Saturating well on room air today 4/1  -Tylenol PRN  -CRP = 20.70 -> 18.47 -> 15.78   -Ferritin = 999 -> 1091 -> 887   -D-Dimer = 418 -> 429 -> 422   -Daily CBC/CMP/Mg/PO4/CRP/Ferritin/D-Dimer  -COVID PCR status - [] received [X] resulted negative 3/29  -Azithromycin 250mg daily x5 days (started 3/29)  -Hydroxychloroquine 400mg BID x2 doses -> 200mg BID x 4 days (started 3/29)  -Ascorbic Acid 1.5mg Q6H x 5 days  -Thiamine 200mg BID x 5 days   -G6PD status - [] collected, [] received, [] resulted [x] none  -QuantiFeron status - [] collected, [] received, [] resulted [x] none  -T cell subset status - [] collected, [] received, [] resulted [x] none  -Immunoglobulins status - [] collected, [] received, [] resulted [x] none  -Triglycerides status - [] collected, [] received, [] resulted [x] none

## 2020-04-01 NOTE — PROGRESS NOTE ADULT - ATTENDING COMMENTS
Pt. seen and examined by me earlier today; I have read Dr. Watkins's note, I agree w/ his findings and plan of care as documented; Pt. is medically-clear for d/c
Exam per PGY-1; I have read Dr. Watkins's note, I agree w/ his findings and plan of care as documented
Pt. seen and examined by me earlier today; I have read Dr. Watkins's note, I agree w/ his findings and plan of care as documented; Pt. medically-clear for d/c home, pending progress w/ PT

## 2020-04-01 NOTE — PROGRESS NOTE ADULT - SUBJECTIVE AND OBJECTIVE BOX
OVERNIGHT EVENTS: None    SUBJECTIVE: Patient seen and examined at the bedside.  He says he feels better and is not coughing as much.    Vital Signs Last 12 Hrs  T(F): 98.2 (04-01-20 @ 05:10), Max: 98.2 (04-01-20 @ 05:10)  HR: 68 (04-01-20 @ 05:10) (68 - 68)  BP: 119/67 (04-01-20 @ 05:10) (119/67 - 119/67)  BP(mean): --  RR: 16 (04-01-20 @ 05:10) (16 - 16)  SpO2: 98% (04-01-20 @ 05:10) (98% - 98%)  I&O's Summary      PHYSICAL EXAM:  General: In no acute distress, resting comfortably in bed, on room air  HEENT: NCAT, PERRL, EOMI, MMM  Neck: No JVD  Respiratory: Clear to auscultation bilaterally with no wheezes, rales, or rhonchi  Cardiovascular: RRR, normal S1 and S2  Vascular: 2+ radial and DP pulses  Abdomen: Soft, NT/ND.  Extremities: Warm and well perfused.  Skin: No gross skin abnormalities or rashes  Neuro: AAOx3. Strength and sensation intact throughout.    LABS:                        10.6   10.96 )-----------( 278      ( 01 Apr 2020 09:26 )             32.3     04-01    136  |  102  |  21  ----------------------------<  99  4.4   |  23  |  1.34<H>    Ca    9.0      01 Apr 2020 09:26  Phos  2.8     04-01  Mg     2.0     04-01    TPro  7.0  /  Alb  3.0<L>  /  TBili  0.2  /  DBili  x   /  AST  14  /  ALT  14  /  AlkPhos  71  04-01          RADIOLOGY & ADDITIONAL TESTS: Reviewed.    MEDICATIONS  (STANDING):  ascorbic acid 1500 milliGRAM(s) Oral four times a day  azithromycin   Tablet 250 milliGRAM(s) Oral every 24 hours  dextrose 5%. 1000 milliLiter(s) (50 mL/Hr) IV Continuous <Continuous>  dextrose 50% Injectable 12.5 Gram(s) IV Push once  dextrose 50% Injectable 25 Gram(s) IV Push once  dextrose 50% Injectable 25 Gram(s) IV Push once  enoxaparin Injectable 40 milliGRAM(s) SubCutaneous every 24 hours  famotidine    Tablet 20 milliGRAM(s) Oral daily  finasteride 5 milliGRAM(s) Oral daily  hydrocodone/homatropine Syrup 5 milliLiter(s) Oral every 6 hours  hydroxychloroquine 200 milliGRAM(s) Oral every 12 hours  insulin glargine Injectable (LANTUS) 10 Unit(s) SubCutaneous at bedtime  insulin lispro (HumaLOG) corrective regimen sliding scale   SubCutaneous Before meals and at bedtime  isavuconazonium sulfate 186 milliGRAM(s) 2 Capsule(s) Oral every 24 hours  thiamine 200 milliGRAM(s) Oral <User Schedule>    MEDICATIONS  (PRN):  acetaminophen   Tablet .. 650 milliGRAM(s) Oral every 6 hours PRN fever  dextrose 40% Gel 15 Gram(s) Oral once PRN Blood Glucose LESS THAN 70 milliGRAM(s)/deciliter  glucagon  Injectable 1 milliGRAM(s) IntraMuscular once PRN Glucose LESS THAN 70 milligrams/deciliter      Allergies    No Known Allergies    Intolerances

## 2020-04-01 NOTE — PROGRESS NOTE ADULT - PROBLEM SELECTOR PLAN 8
F: None  E: Replete PRN  N: Consistent Carb  GI PPX: Pepcid  DVT PPX: Lovenox  Code: Full  Dispo: Home today 4/1
F: None  E: Replete PRN  N: Consistent Carb  GI PPX: Pepcid  DVT PPX: Lovenox  Code: Full  Dispo: Home vs [facility] pending PT recommendations

## 2020-04-01 NOTE — PROGRESS NOTE ADULT - PROBLEM SELECTOR PLAN 3
Admission creatinine 1.56 in the setting of low PO intake for 1 week. Suspect pre-renal etiology. Received 500cc IVF.    -Monitor creatinine

## 2020-04-01 NOTE — PROGRESS NOTE ADULT - ASSESSMENT
per Internal Medicine    60-year-old male with a past medical history of DM, BPH, skull fungal osteomyelitis, deafness, and DEON who presented with a 5-day history of fevers, cough, and progressive dyspnea in the setting of a known COVID-19 positive infection. Admitted for hypoxic respiratory failure.     Problem/Plan - 1:  ·  Problem: Pneumonia, viral.  Plan: Secondary to COVID-19.     -Treatment as below.     Problem/Plan - 2:  ·  Problem: Infection due to 2019 novel coronavirus.  Plan: Known positive COVID-19. Presented to ED with dyspnea. Reportedly hypoxic on 3L. Swabbed and negative in the ED. Not suspecting superimposed bacterial pneumonia given low procalcitonin and lack of leukocytosis.    -Saturating well on room air today 3/31  -Tylenol PRN  -CRP = 20.70 -> 18.47 -> 15.78   -Ferritin = 999 -> 1091 -> 887   -D-Dimer = 418 -> 429 -> 422   -Daily CBC/CMP/Mg/PO4/CRP/Ferritin/D-Dimer  -COVID PCR status - [] received [X] resulted negative 3/29  -Azithromycin 250mg daily x5 days (started 3/29)  -Hydroxychloroquine 400mg BID x2 doses -> 200mg BID x 4 days (started 3/29)  -Ascorbic Acid 1.5mg Q6H x 5 days  -Thiamine 200mg BID x 5 days   -G6PD status - [] collected, [] received, [] resulted [x] none  -QuantiFeron status - [] collected, [] received, [] resulted [x] none  -T cell subset status - [] collected, [] received, [] resulted [x] none  -Immunoglobulins status - [] collected, [] received, [] resulted [x] none  -Triglycerides status - [] collected, [] received, [] resulted [x] none.     Problem/Plan - 3:  ·  Problem: SANDY (acute kidney injury).  Plan: Admission creatinine 1.56 in the setting of low PO intake for 1 week. Suspect pre-renal etiology. Received 500cc IVF.    -Creatinine 1.33 today 3/31  -Monitor creatinine.     Problem/Plan - 4:  ·  Problem: DEON (obstructive sleep apnea).  Plan: History of DEON on CPAP    -No CPAP/BiPAP given COVID-19 aerosolization risk  -NC at bedtime if needed.     Problem/Plan - 5:  ·  Problem: Diabetes.  Plan: History of Toujeo at home. On Lantus at home as well. Admission .    -Continue Lantus 10U QHS  -MISS while inpatient.     Problem/Plan - 6:  Problem: Osteomyelitis. Plan: History of fungal osteomyelitis on Cresemba 2 caps QD.    -Continue Cresemba QD.    Problem/Plan - 7:  ·  Problem: History of BPH.  Plan: History of BPH on Finasteride 5mg QD    -Continue Finasteride 5mg QD.     Problem/Plan - 8:  ·  Problem: Nutrition, metabolism, and development symptoms.  Plan: F: None  E: Replete PRN  N: Consistent Carb  GI PPX: Pepcid  DVT PPX: Lovenox  Code: Full

## 2020-04-01 NOTE — DISCHARGE NOTE NURSING/CASE MANAGEMENT/SOCIAL WORK - PATIENT PORTAL LINK FT
You can access the FollowMyHealth Patient Portal offered by [facility] by registering at the following website: [email or web address]. By joining [facility]’s FollowMyHealth portal, you will also be able to view your health information using other applications (apps) compatible with our system.

## 2020-04-01 NOTE — PROGRESS NOTE ADULT - SUBJECTIVE AND OBJECTIVE BOX
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 Was swabbed on 3/18 outpatient, tested positive for COVID19. Was started on azithromycin by PCP on the 25th but missed his last 2 days of doses. 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 Pt says he has no appetite. Lives at home with wife who is sick withi fevers and dry cough, and 11yr child healthy.   Denied c/n/v/d, chest pain, abd pain, dysuria, headaches, dizziness, lightheadedness. Sick contact is wife.  ED course: T 100.8F, /70, HR 86, satting 96 on 3-4Ls (documented incorrectly in flowsheet). EKG NSR. Tylenol 650mg, ceftriaxone 1g, azithro 500mg. Thiamine 200mg IV, ascorbic acid 1500mg IV. CXR with bilateral infiltrates cw multifocal pna/covid19  Patient cell phone (uses headphone and helps with hearing) [phone]. Wife Silvia cell: [phone] (29 Mar 2020 16:55)                            10.6   10.96 )-----------( 278      ( 01 Apr 2020 09:26 )             32.3       04-01    136  |  102  |  21  ----------------------------<  99  4.4   |  23  |  1.34<H>    Ca    9.0      01 Apr 2020 09:26  Phos  2.8     04-01  Mg     2.0     04-01    TPro  7.0  /  Alb  3.0<L>  /  TBili  0.2  /  DBili  x   /  AST  14  /  ALT  14  /  AlkPhos  71  04-01    Vital Signs Last 24 Hrs  T(C): 36.8 (01 Apr 2020 05:10), Max: 36.8 (01 Apr 2020 05:10)  T(F): 98.2 (01 Apr 2020 05:10), Max: 98.2 (01 Apr 2020 05:10)  HR: 68 (01 Apr 2020 05:10) (68 - 85)  BP: 119/67 (01 Apr 2020 05:10) (112/61 - 129/72)  BP(mean): --  RR: 16 (01 Apr 2020 05:10) (16 - 18)  SpO2: 98% (01 Apr 2020 05:10) (96% - 98%)    MEDICATIONS  (STANDING):  ascorbic acid 1500 milliGRAM(s) Oral four times a day  azithromycin   Tablet 250 milliGRAM(s) Oral every 24 hours  dextrose 5%. 1000 milliLiter(s) (50 mL/Hr) IV Continuous <Continuous>  dextrose 50% Injectable 12.5 Gram(s) IV Push once  dextrose 50% Injectable 25 Gram(s) IV Push once  dextrose 50% Injectable 25 Gram(s) IV Push once  enoxaparin Injectable 40 milliGRAM(s) SubCutaneous every 24 hours  famotidine    Tablet 20 milliGRAM(s) Oral daily  finasteride 5 milliGRAM(s) Oral daily  hydrocodone/homatropine Syrup 5 milliLiter(s) Oral every 6 hours  hydroxychloroquine 200 milliGRAM(s) Oral every 12 hours  insulin glargine Injectable (LANTUS) 10 Unit(s) SubCutaneous at bedtime  insulin lispro (HumaLOG) corrective regimen sliding scale   SubCutaneous Before meals and at bedtime  isavuconazonium sulfate 186 milliGRAM(s) 2 Capsule(s) Oral every 24 hours  thiamine 200 milliGRAM(s) Oral <User Schedule>    MEDICATIONS  (PRN):  acetaminophen   Tablet .. 650 milliGRAM(s) Oral every 6 hours PRN fever  dextrose 40% Gel 15 Gram(s) Oral once PRN Blood Glucose LESS THAN 70 milliGRAM(s)/deciliter  glucagon  Injectable 1 milliGRAM(s) IntraMuscular once PRN Glucose LESS THAN 70 milligrams/deciliter    Currently Undergoing Physical Therapy at bedside.    Functional Status Assessment:    Previous Level of Function:     · Ambulation Skills  independent    · Transfer Skills  independent    · ADL Skills  independent    · Additional Comments  Pt reports living with his wife and child in a 2nd floor walk up apartment. Pt reports being completely independent with all ADL's and functional mobility without use of an assistive device.      Cognitive Status Examination:   · Orientation  oriented to person, place, time and situation    · Level of Consciousness  alert    · Follows Commands and Answers Questions  100% of the time; able to follow single-step instructions    · Personal Safety and Judgment  intact      Range of Motion Exam:   · Active Range of Motion Examination  bilateral upper extremity Active ROM was WFL (within functional limits); bilateral  lower extremity Active ROM was WFL (within functional limits)      Manual Muscle Testing:   · Manual Muscle Testing Results  grossly >3+/5 t/o b/l UE's/LE's based on functional mobility      Bed Mobility: Scooting/Bridging:     · Level of [place]  independent      Bed Mobility: Sit to Supine:     · Level of [place]  supervision    · Physical Assist/Nonphysical Assist  nonverbal cues (demo/gestures); verbal cues; supervision      Bed Mobility: Supine to Sit:     · Level of [place]  supervision; contact guard    · Physical Assist/Nonphysical Assist  1 person assist; nonverbal cues (demo/gestures); verbal cues    · Assistive Device  bed rails      Bed Mobility Analysis:     · Bed Mobility Limitations  decreased ability to use arms for pushing/pulling; decreased ability to use legs for bridging/pushing; impaired ability to control trunk for mobility    · Impairments Contributing to Impaired Bed Mobility  impaired balance; impaired postural control; decreased strength      Transfer: Sit to Stand:     · Level of [place]  supervision; contact guard    · Physical Assist/Nonphysical Assist  1 person assist; nonverbal cues (demo/gestures); verbal cues    · Weight-Bearing Restrictions  weight-bearing as tolerated    · Assistive Device  none      Transfer: Stand to Sit:     · Level of [place]  supervision    · Physical Assist/Nonphysical Assist  supervision; verbal cues; nonverbal cues (demo/gestures)    · Weight-Bearing Restrictions  weight-bearing as tolerated    · Assistive Device  none      Sit/Stand Transfer Safety Analysis:     · Transfer Safety Concerns Noted  decreased balance during turns; decreased proprioception; decreased sequencing ability    · Impairments Contributing to Impaired Transfers  impaired balance; impaired postural control; decreased strength      Gait Skills:     · Level of [place]  supervision; contact guard    · Physical Assist/Nonphysical Assist  1 person assist; nonverbal cues (demo/gestures); verbal cues    · Weight-Bearing Restrictions  weight-bearing as tolerated    · Assistive Device  none    · Gait Distance  10 feet; limited by severe coughing and poor endurance            Gait Analysis:     · Gait Pattern Used  2-point gait    · Gait Deviations Noted  increased time in double stance; decreased step length    · Impairments Contributing to Gait Deviations  impaired balance; impaired postural control; decreased strength; poor endurance      Stair Negotiation:     · Level of [place]  TBA      Balance Skills Assessment:     · Sitting Balance: Static  normal balance    · Sitting Balance: Dynamic  good balance    · Sit-to-Stand Balance  good balance    · Standing Balance: Static  fair balance    · Standing Balance: Dynamic  fair balance    · Systems Impairment Contributing to Balance Disturbance  musculoskeletal    · Identified Impairments Contributing to Balance Disturbance  decreased strength      Clinical Impressions:   · Criteria for Skilled Therapeutic Interventions  impairments found; functional limitations in following categories; risk reduction/prevention; rehab potential; therapy frequency; anticipated equipment needs at discharge; anticipated discharge recommendation    · Impairments Found (describe specific impairments)  aerobic capacity/endurance; decreased midline orientation; muscle strength; ventilation and respiration/gas exchange; gait, locomotion, and balance; posture    · Functional Limitations in Following Categories (describe specific limitations)  self-care; home management    · Risk Reduction/Prevention (Describe Specific Areas of risk reduction/prevention)  risk factors    · Risk Areas  fall    · Rehab Potential  good, to achieve stated therapy goals    · Therapy Frequency  2-3x/week          PM&R Impression: as above    Current Disposition Plan Recommendations:  d/c home with no post discharge rehab needs

## 2020-04-02 LAB
CULTURE RESULTS: NO GROWTH — SIGNIFICANT CHANGE UP
CULTURE RESULTS: NO GROWTH — SIGNIFICANT CHANGE UP
SPECIMEN SOURCE: SIGNIFICANT CHANGE UP
SPECIMEN SOURCE: SIGNIFICANT CHANGE UP

## 2020-04-08 RX ORDER — ISAVUCONAZONIUM SULFATE 40 MG/1
2 CAPSULE ORAL
Qty: 0 | Refills: 0 | DISCHARGE

## 2020-04-08 RX ORDER — INSULIN GLARGINE 100 [IU]/ML
0 INJECTION, SOLUTION SUBCUTANEOUS
Qty: 0 | Refills: 0 | DISCHARGE

## 2020-04-08 RX ORDER — FINASTERIDE 5 MG/1
1 TABLET, FILM COATED ORAL
Qty: 0 | Refills: 0 | DISCHARGE

## 2020-05-29 PROBLEM — G47.33 OBSTRUCTIVE SLEEP APNEA (ADULT) (PEDIATRIC): Chronic | Status: ACTIVE | Noted: 2020-03-29

## 2020-05-29 PROBLEM — Z87.438 PERSONAL HISTORY OF OTHER DISEASES OF MALE GENITAL ORGANS: Chronic | Status: ACTIVE | Noted: 2020-03-29

## 2020-05-29 PROBLEM — E11.9 TYPE 2 DIABETES MELLITUS WITHOUT COMPLICATIONS: Chronic | Status: ACTIVE | Noted: 2020-03-29

## 2020-05-29 PROBLEM — M86.9 OSTEOMYELITIS, UNSPECIFIED: Chronic | Status: ACTIVE | Noted: 2020-03-29

## 2020-06-09 ENCOUNTER — APPOINTMENT (OUTPATIENT)
Dept: OTOLARYNGOLOGY | Facility: CLINIC | Age: 60
End: 2020-06-09

## 2020-07-10 ENCOUNTER — APPOINTMENT (OUTPATIENT)
Dept: UROLOGY | Facility: CLINIC | Age: 60
End: 2020-07-10
Payer: COMMERCIAL

## 2020-07-10 VITALS — TEMPERATURE: 98 F

## 2020-07-10 PROCEDURE — 99213 OFFICE O/P EST LOW 20 MIN: CPT

## 2020-07-10 NOTE — HISTORY OF PRESENT ILLNESS
[Nocturia] : nocturia [Urinary Incontinence] : no urinary incontinence [FreeTextEntry1] : Patient following up for history of bacterial prostatitis, elevated PSA with prior negative biopsy and LUTs due to BPH managed with finasteride. He is doing well on present regimen with stable non-bothersome symptoms as detailed below.  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 Has been treated at [facility]. \par LUTs unchanged from below - has stayed in finasteride. \par \par latest PSA 1.03 - 6/20 \par   [Urinary Frequency] : no urinary frequency [Urinary Urgency] : no urinary urgency [Straining] : no straining [Weak Stream] : no weak stream [Intermittency] : no intermittency [Dysuria] : no dysuria [Bladder Spasm] : no bladder spasm [Hematuria - Gross] : no gross hematuria [Abdominal Pain] : no abdominal pain

## 2020-07-10 NOTE — PHYSICAL EXAM
[General Appearance - Well Developed] : well developed [General Appearance - Well Nourished] : well nourished [Abdomen Tenderness] : non-tender [Abdomen Mass (___ Cm)] : no abdominal mass palpated [Abdomen Soft] : soft [Prostate Size ___ gm] : prostate size [unfilled] gm [Rectal Exam - Rectum] : digital rectal exam was normal [No Prostate Nodules] : no prostate nodules [] : no rash [Edema] : no peripheral edema [Normal Station and Gait] : the gait and station were normal for the patient's age [Oriented To Time, Place, And Person] : oriented to person, place, and time [Exaggerated Use Of Accessory Muscles For Inspiration] : no accessory muscle use

## 2020-12-08 ENCOUNTER — TRANSCRIPTION ENCOUNTER (OUTPATIENT)
Age: 60
End: 2020-12-08

## 2022-02-15 ENCOUNTER — APPOINTMENT (OUTPATIENT)
Dept: UROLOGY | Facility: CLINIC | Age: 62
End: 2022-02-15
Payer: COMMERCIAL

## 2022-02-15 PROCEDURE — 99213 OFFICE O/P EST LOW 20 MIN: CPT

## 2022-02-15 NOTE — PHYSICAL EXAM
[General Appearance - Well Developed] : well developed [General Appearance - Well Nourished] : well nourished [Abdomen Soft] : soft [Abdomen Tenderness] : non-tender [Abdomen Mass (___ Cm)] : no abdominal mass palpated [Penis Abnormality] : normal uncircumcised penis [Urinary Bladder Findings] : the bladder was normal on palpation [Scrotum] : the scrotum was normal [Epididymis] : the epididymides were normal [Testes Mass (___cm)] : there were no testicular masses [Rectal Exam - Rectum] : digital rectal exam was normal [No Prostate Nodules] : no prostate nodules [Prostate Size ___ gm] : prostate size [unfilled] gm [Edema] : no peripheral edema [] : no respiratory distress [Exaggerated Use Of Accessory Muscles For Inspiration] : no accessory muscle use [Oriented To Time, Place, And Person] : oriented to person, place, and time [Normal Station and Gait] : the gait and station were normal for the patient's age [No Focal Deficits] : no focal deficits

## 2022-02-15 NOTE — HISTORY OF PRESENT ILLNESS
[Nocturia] : nocturia [FreeTextEntry1] : Patient following up for history of bacterial prostatitis, elevated PSA with prior negative biopsy and LUTs due to BPH managed with finasteride. He is doing well on present regimen with stable non-bothersome symptoms as detailed below.  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 Has been treated at [facility]. \par LUTs unchanged from below - has stayed in finasteride. \par \par latest PSA 1.03 - 6/20 \par  \par 2/22 - in finasteride, with no complaints as below.\par no dysuria or hematuria. \par PSA 1.17 11/21  [Urinary Incontinence] : no urinary incontinence [Urinary Urgency] : no urinary urgency [Urinary Frequency] : no urinary frequency [Straining] : no straining [Weak Stream] : no weak stream [Intermittency] : no intermittency [Dysuria] : no dysuria [Hematuria - Gross] : no gross hematuria [Bladder Spasm] : no bladder spasm [Abdominal Pain] : no abdominal pain

## 2022-04-28 NOTE — ED ADULT NURSE NOTE - TIMING
You were seen today in the Emergency Department  Please follow up with your Primary Care Provider in the next 1-2 days to recheck your symptoms and to follow up on your visit to the Emergency Department today  Please return to the Emergency Department if you have fevers, chills, chest pain, shortness of breath, are unable to eat or drink, or have any other symptoms that concern you  Please look this over and let your nurse and/or me know if you have any further questions before you leave 
constant

## 2022-05-05 NOTE — H&P ADULT - NSICDXPASTMEDICALHX_GEN_ALL_CORE_FT
Scheduled fasting labs and urine   Schedule annual sent   pt sent letter   PAST MEDICAL HISTORY:  Diabetes     History of BPH     DEON (obstructive sleep apnea)     Osteomyelitis

## 2023-01-18 ENCOUNTER — APPOINTMENT (OUTPATIENT)
Dept: UROLOGY | Facility: CLINIC | Age: 63
End: 2023-01-18
Payer: MEDICARE

## 2023-01-18 PROCEDURE — 99213 OFFICE O/P EST LOW 20 MIN: CPT

## 2023-01-18 NOTE — HISTORY OF PRESENT ILLNESS
[FreeTextEntry1] : Patient following up for history of bacterial prostatitis, elevated PSA with prior negative biopsy and LUTs due to BPH managed with finasteride. He is doing well on present regimen with stable non-bothersome symptoms as detailed below.  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 Has been treated at [facility]. \par LUTs unchanged from below - has stayed in finasteride. \par \par latest PSA 1.03 - 6/20 \par  \par 2/22 - in finasteride, with no complaints as below.\par no dysuria or hematuria. \par PSA 1.17 11/21 \par \par 1/23 on finasteride.\par happy with results. nocturia 1-2 times; drinks a lot of fluid so some frequency. but no urgency. FOS decent with no hesitancy or intermittency. \par Has ED - intimate without penetration. Hasn't tried oral medication for yeaRS\par PSA 10/22 0.9

## 2023-01-20 ENCOUNTER — NON-APPOINTMENT (OUTPATIENT)
Age: 63
End: 2023-01-20

## 2023-06-09 NOTE — ED ADULT NURSE NOTE - NS ED NURSE REPORT GIVEN DT
06/09/2023  Eligio Richardson is a 77 y.o., male.      Pre-op Assessment    I have reviewed the Patient Summary Reports.     I have reviewed the Nursing Notes.       Review of Systems  Anesthesia Hx:  No problems with previous Anesthesia    Cardiovascular:   Hypertension CAD      Pulmonary:   Shortness of breath Sleep Apnea    Renal/:   Chronic Renal Disease    Hepatic/GI:   Hiatal Hernia, GERD    Musculoskeletal:   Arthritis     Neurological:   Neuromuscular Disease,    Endocrine:   Hypothyroidism        Physical Exam  General: Well nourished    Chest/Lungs:  Normal Respiratory Rate    Heart:  Rate: Normal  Rhythm: Regular Rhythm        Anesthesia Plan  Type of Anesthesia, risks & benefits discussed:    Anesthesia Type: Gen ETT  Intra-op Monitoring Plan: Standard ASA Monitors  Post Op Pain Control Plan: multimodal analgesia and IV/PO Opioids PRN  Induction:  IV  Airway Plan: Video  Informed Consent: Informed consent signed with the Patient and all parties understand the risks and agree with anesthesia plan.  All questions answered.   ASA Score: 3  Day of Surgery Review of History & Physical: H&P Update referred to the surgeon/provider.    Ready For Surgery From Anesthesia Perspective.     .       29-Mar-2020 16:55

## 2024-04-18 ENCOUNTER — RX RENEWAL (OUTPATIENT)
Age: 64
End: 2024-04-18

## 2024-04-23 ENCOUNTER — APPOINTMENT (OUTPATIENT)
Dept: UROLOGY | Facility: CLINIC | Age: 64
End: 2024-04-23
Payer: MEDICARE

## 2024-04-23 DIAGNOSIS — N40.1 BENIGN PROSTATIC HYPERPLASIA WITH LOWER URINARY TRACT SYMPMS: ICD-10-CM

## 2024-04-23 DIAGNOSIS — N52.01 ERECTILE DYSFUNCTION DUE TO ARTERIAL INSUFFICIENCY: ICD-10-CM

## 2024-04-23 PROCEDURE — 99212 OFFICE O/P EST SF 10 MIN: CPT

## 2024-04-23 PROCEDURE — G2211 COMPLEX E/M VISIT ADD ON: CPT

## 2024-04-23 NOTE — HISTORY OF PRESENT ILLNESS
[FreeTextEntry1] : Patient following up for history of bacterial prostatitis, elevated PSA with prior negative biopsy and LUTs due to BPH managed with finasteride. He is doing well on present regimen with stable non-bothersome symptoms as detailed below.    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 Has been treated at [facility].  LUTs unchanged from below - has stayed in finasteride.   latest PSA 1.03 - 6/20 2/22 - in finasteride, with no complaints as below. no dysuria or hematuria.  PSA 1.17 11/21 1/23 on finasteride. happy with results. nocturia 1-2 times; drinks a lot of fluid so some frequency. but no urgency. FOS decent with no hesitancy or intermittency.  Has ED - intimate without penetration. Hasn't tried oral medication for years PSA 10/22 0.9   4/24 - remains on finasteride Drinks 2-3 liters a day some frequency and nocturia 2-3 times.  both Tadalafil and sildenafil have not worked;  Recent PSA 1.20

## 2024-05-06 ENCOUNTER — APPOINTMENT (OUTPATIENT)
Dept: RHEUMATOLOGY | Facility: CLINIC | Age: 64
End: 2024-05-06
Payer: MEDICARE

## 2024-05-06 ENCOUNTER — TRANSCRIPTION ENCOUNTER (OUTPATIENT)
Age: 64
End: 2024-05-06

## 2024-05-06 VITALS
HEIGHT: 69 IN | BODY MASS INDEX: 28.14 KG/M2 | RESPIRATION RATE: 16 BRPM | DIASTOLIC BLOOD PRESSURE: 71 MMHG | WEIGHT: 190 LBS | SYSTOLIC BLOOD PRESSURE: 154 MMHG | OXYGEN SATURATION: 98 % | HEART RATE: 67 BPM | TEMPERATURE: 98.4 F

## 2024-05-06 DIAGNOSIS — E13.9 OTHER SPECIFIED DIABETES MELLITUS W/OUT COMPLICATIONS: ICD-10-CM

## 2024-05-06 DIAGNOSIS — R76.8 OTHER SPECIFIED ABNORMAL IMMUNOLOGICAL FINDINGS IN SERUM: ICD-10-CM

## 2024-05-06 DIAGNOSIS — M86.9 OSTEOMYELITIS, UNSPECIFIED: ICD-10-CM

## 2024-05-06 PROCEDURE — 99204 OFFICE O/P NEW MOD 45 MIN: CPT

## 2024-05-06 RX ORDER — OLMESARTAN MEDOXOMIL 5 MG/1
5 TABLET, FILM COATED ORAL
Refills: 0 | Status: ACTIVE | COMMUNITY
Start: 2024-05-06

## 2024-05-06 NOTE — HISTORY OF PRESENT ILLNESS
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   Pt says he occasionally gets R sided facial and lip weakness. 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  Pt had MRI studies which were ok.   No fevers, h/a, rashes, hair loss, oral ulcers, epistaxis, sinusitis,  swollen glands, dry mouth, dry eyes, CP, SOB, cough, vision changes, abdominal pain, GERD, v/d, blood in stool or urine, focal weakness, sensory loss,  Raynaud's, joint pain, swelling, weight loss.  +feels stiffness on body  +reports proteinuria test, and elevation of Cr?

## 2024-05-06 NOTE — ASSESSMENT
[FreeTextEntry1] : 63 y/o M w positive DsDNA =labs w positive DsDNA =past maxillofacial fungal infection =residual R sided hearing loss, facial weakness =reports Cr elevation?   Counseled on lupus, and heterogenous nature of disease in different individuals. Counseled that lupus can affect various organ systems, including msk, cutaneous, renal, pulmonary, CNS system. Explained that lupus, if untreated, can lead to organ damage. Explained that pt has higher risk of infections, due to tx and dz itself, and lymphoma. Explained pt has higher chance of mortality and morbidity due to lupus.   Unsure if pt has lupus. I do not think facial symptoms are 2/2 to lupus but structural damage from fungal infection Pt reports elevated Cr, so will assess w further serologies. LN?   Plan -Labs w serologies, inflammatory markers RTO depending on above results

## 2024-05-06 NOTE — CONSULT LETTER
[Dear  ___] : Dear  [unfilled], [Consult Letter:] : I had the pleasure of evaluating your patient, [unfilled]. [Consult Closing:] : Thank you very much for allowing me to participate in the care of this patient.  If you have any questions, please do not hesitate to contact me. [Sincerely,] : Sincerely, [FreeTextEntry2] : Dr. Vineet Hall [address],  [place] [address] [FreeTextEntry3] : Galo Tiwari MD

## 2024-05-06 NOTE — PHYSICAL EXAM
[General Appearance - Well Nourished] : well nourished [General Appearance - Well Developed] : well developed [Sclera] : the sclera and conjunctiva were normal [Auscultation Breath Sounds / Voice Sounds] : lungs were clear to auscultation bilaterally [Heart Rate And Rhythm] : heart rate was normal and rhythm regular [Heart Sounds] : normal S1 and S2 [Murmurs] : no murmurs [Breast Palpation Mass] : no palpable masses [Abdomen Soft] : soft [Abdomen Tenderness] : non-tender [Abdomen Mass (___ Cm)] : no abdominal mass palpated [Cervical Lymph Nodes Enlarged Posterior Bilaterally] : posterior cervical [Cervical Lymph Nodes Enlarged Anterior Bilaterally] : anterior cervical [Supraclavicular Lymph Nodes Enlarged Bilaterally] : supraclavicular [Musculoskeletal - Swelling] : no joint swelling seen [] : no rash [Skin Lesions] : no skin lesions [Oriented To Time, Place, And Person] : oriented to person, place, and time

## 2024-05-09 LAB
ALBUMIN SERPL ELPH-MCNC: 4.5 G/DL
ALP BLD-CCNC: 97 U/L
ALT SERPL-CCNC: 5 U/L
ANA SER IF-ACNC: NEGATIVE
ANION GAP SERPL CALC-SCNC: 11 MMOL/L
APPEARANCE: CLEAR
AST SERPL-CCNC: 10 U/L
BACTERIA: NEGATIVE /HPF
BASOPHILS # BLD AUTO: 0.06 K/UL
BASOPHILS NFR BLD AUTO: 0.7 %
BILIRUB SERPL-MCNC: 0.3 MG/DL
BILIRUBIN URINE: NEGATIVE
BLOOD URINE: NEGATIVE
BUN SERPL-MCNC: 32 MG/DL
C3 SERPL-MCNC: 164 MG/DL
C4 SERPL-MCNC: 38 MG/DL
CALCIUM SERPL-MCNC: 9.4 MG/DL
CAST: 0 /LPF
CHLORIDE SERPL-SCNC: 102 MMOL/L
CK SERPL-CCNC: 64 U/L
CO2 SERPL-SCNC: 23 MMOL/L
COLOR: YELLOW
CREAT SERPL-MCNC: 1.41 MG/DL
CREAT SPEC-SCNC: 79 MG/DL
CREAT/PROT UR: 0.7 RATIO
CRP SERPL-MCNC: 30 MG/L
DSDNA AB SER-ACNC: 41 IU/ML
EGFR: 55 ML/MIN/1.73M2
ENA RNP AB SER IA-ACNC: <0.2 AL
ENA SM AB SER IA-ACNC: <0.2 AL
ENA SS-A AB SER IA-ACNC: <0.2 AL
ENA SS-B AB SER IA-ACNC: <0.2 AL
EOSINOPHIL # BLD AUTO: 0.37 K/UL
EOSINOPHIL NFR BLD AUTO: 4 %
EPITHELIAL CELLS: 0 /HPF
ERYTHROCYTE [SEDIMENTATION RATE] IN BLOOD BY WESTERGREN METHOD: 62 MM/HR
GLUCOSE QUALITATIVE U: NEGATIVE MG/DL
GLUCOSE SERPL-MCNC: 199 MG/DL
HCT VFR BLD CALC: 45.6 %
HGB BLD-MCNC: 14.4 G/DL
IMM GRANULOCYTES NFR BLD AUTO: 0.5 %
KETONES URINE: NEGATIVE MG/DL
LEUKOCYTE ESTERASE URINE: NEGATIVE
LYMPHOCYTES # BLD AUTO: 1.94 K/UL
LYMPHOCYTES NFR BLD AUTO: 21 %
MAN DIFF?: NORMAL
MCHC RBC-ENTMCNC: 26.9 PG
MCHC RBC-ENTMCNC: 31.6 GM/DL
MCV RBC AUTO: 85.1 FL
MICROSCOPIC-UA: NORMAL
MONOCYTES # BLD AUTO: 0.68 K/UL
MONOCYTES NFR BLD AUTO: 7.4 %
NEUTROPHILS # BLD AUTO: 6.13 K/UL
NEUTROPHILS NFR BLD AUTO: 66.4 %
NITRITE URINE: NEGATIVE
PH URINE: 6
PLATELET # BLD AUTO: 256 K/UL
POTASSIUM SERPL-SCNC: 4.7 MMOL/L
PROT SERPL-MCNC: 7.5 G/DL
PROT UR-MCNC: 56 MG/DL
PROTEIN URINE: 30 MG/DL
RBC # BLD: 5.36 M/UL
RBC # FLD: 14.4 %
RED BLOOD CELLS URINE: 0 /HPF
SODIUM SERPL-SCNC: 136 MMOL/L
SPECIFIC GRAVITY URINE: 1.01
UROBILINOGEN URINE: 0.2 MG/DL
WBC # FLD AUTO: 9.23 K/UL
WHITE BLOOD CELLS URINE: 0 /HPF

## 2024-05-13 LAB — G6PD SER-CCNC: 13.3 U/G HGB

## 2024-07-08 DIAGNOSIS — M32.9 SYSTEMIC LUPUS ERYTHEMATOSUS, UNSPECIFIED: ICD-10-CM

## 2024-09-13 ENCOUNTER — APPOINTMENT (OUTPATIENT)
Dept: UROLOGY | Facility: CLINIC | Age: 64
End: 2024-09-13

## 2024-09-16 ENCOUNTER — APPOINTMENT (OUTPATIENT)
Dept: RHEUMATOLOGY | Facility: CLINIC | Age: 64
End: 2024-09-16
Payer: MEDICARE

## 2024-09-16 ENCOUNTER — LABORATORY RESULT (OUTPATIENT)
Age: 64
End: 2024-09-16

## 2024-09-16 VITALS
DIASTOLIC BLOOD PRESSURE: 81 MMHG | OXYGEN SATURATION: 99 % | BODY MASS INDEX: 27.7 KG/M2 | HEART RATE: 76 BPM | RESPIRATION RATE: 14 BRPM | HEIGHT: 69 IN | SYSTOLIC BLOOD PRESSURE: 142 MMHG | WEIGHT: 187 LBS | TEMPERATURE: 98.1 F

## 2024-09-16 DIAGNOSIS — N40.0 BENIGN PROSTATIC HYPERPLASIA WITHOUT LOWER URINARY TRACT SYMPMS: ICD-10-CM

## 2024-09-16 DIAGNOSIS — M32.9 SYSTEMIC LUPUS ERYTHEMATOSUS, UNSPECIFIED: ICD-10-CM

## 2024-09-16 DIAGNOSIS — R74.8 ABNORMAL LEVELS OF OTHER SERUM ENZYMES: ICD-10-CM

## 2024-09-16 DIAGNOSIS — Z79.899 OTHER LONG TERM (CURRENT) DRUG THERAPY: ICD-10-CM

## 2024-09-16 PROCEDURE — 99214 OFFICE O/P EST MOD 30 MIN: CPT

## 2024-09-16 PROCEDURE — G2211 COMPLEX E/M VISIT ADD ON: CPT

## 2024-09-16 NOTE — REASON FOR VISIT
[Follow-Up: _____] : a [unfilled] follow-up visit [Consultation] : a consultation visit [FreeTextEntry1] : Dr. Vineet Hall

## 2024-09-16 NOTE — ASSESSMENT
[FreeTextEntry1] : 63 y/o M w positive DsDNA =no significant systemic symptoms except mild CKD w mild proteinuria (see below)  =labs 5/24 ESR 62, CRP 30, DsDNA 41 Cr 1.41, UA 30, protein/cr 0.7; EDNA, PAUL, Sjogren negative   Will repeat bloodwork, but if positive, w proteinuria, will have to further work up.   Plan -Labs w serologies, inflammatory markers (will send crithidia DsDNA)  RTO depending on above results

## 2024-09-16 NOTE — HISTORY OF PRESENT ILLNESS
[___ Month(s) Ago] : [unfilled] month(s) ago [FreeTextEntry1] : =pt went to [facility] and had bloodwork done there =pt was found not have lupus =pt was found to have mild renal dysfunction, pt doing lifestyle changes =no fevers, rashes, swollen glands, CP, SOB, cough, abdominal pain, n/v/d, focal weakness, sensory loss, Raynaud's, joint pain, swelling

## 2024-09-24 LAB
ALBUMIN SERPL ELPH-MCNC: 4.6 G/DL
ALP BLD-CCNC: 91 U/L
ALT SERPL-CCNC: 5 U/L
ANION GAP SERPL CALC-SCNC: 13 MMOL/L
APPEARANCE: CLEAR
AST SERPL-CCNC: 12 U/L
BACTERIA: NEGATIVE /HPF
BASOPHILS # BLD AUTO: 0.07 K/UL
BASOPHILS NFR BLD AUTO: 0.8 %
BILIRUB SERPL-MCNC: 0.4 MG/DL
BILIRUBIN URINE: NEGATIVE
BLOOD URINE: NEGATIVE
BUN SERPL-MCNC: 32 MG/DL
C3 SERPL-MCNC: 137 MG/DL
C4 SERPL-MCNC: 30 MG/DL
CALCIUM SERPL-MCNC: 9.8 MG/DL
CAST: 0 /LPF
CHLORIDE SERPL-SCNC: 104 MMOL/L
CK SERPL-CCNC: 62 U/L
CO2 SERPL-SCNC: 23 MMOL/L
COLOR: YELLOW
CREAT SERPL-MCNC: 1.43 MG/DL
CREAT SPEC-SCNC: 50 MG/DL
CREAT/PROT UR: 0.5 RATIO
CRP SERPL-MCNC: <3 MG/L
DSDNA AB SER-ACNC: 44 IU/ML
EGFR: 54 ML/MIN/1.73M2
EOSINOPHIL # BLD AUTO: 0.52 K/UL
EOSINOPHIL NFR BLD AUTO: 5.9 %
EPITHELIAL CELLS: 0 /HPF
ERYTHROCYTE [SEDIMENTATION RATE] IN BLOOD BY WESTERGREN METHOD: 21 MM/HR
GLUCOSE QUALITATIVE U: NEGATIVE MG/DL
GLUCOSE SERPL-MCNC: 122 MG/DL
HBV CORE IGG+IGM SER QL: NONREACTIVE
HBV SURFACE AB SER QL: NONREACTIVE
HBV SURFACE AG SER QL: NONREACTIVE
HCT VFR BLD CALC: 50.1 %
HCV AB SER QL: NONREACTIVE
HCV S/CO RATIO: 0.13 S/CO
HGB BLD-MCNC: 15.7 G/DL
IMM GRANULOCYTES NFR BLD AUTO: 0.9 %
KETONES URINE: NEGATIVE MG/DL
LEUKOCYTE ESTERASE URINE: NEGATIVE
LYMPHOCYTES # BLD AUTO: 2.35 K/UL
LYMPHOCYTES NFR BLD AUTO: 26.6 %
M TB IFN-G BLD-IMP: NEGATIVE
MAN DIFF?: NORMAL
MCHC RBC-ENTMCNC: 27.7 PG
MCHC RBC-ENTMCNC: 31.3 GM/DL
MCV RBC AUTO: 88.5 FL
MICROSCOPIC-UA: NORMAL
MONOCYTES # BLD AUTO: 0.75 K/UL
MONOCYTES NFR BLD AUTO: 8.5 %
NEUTROPHILS # BLD AUTO: 5.06 K/UL
NEUTROPHILS NFR BLD AUTO: 57.3 %
NITRITE URINE: NEGATIVE
PH URINE: 6
PLATELET # BLD AUTO: 244 K/UL
POTASSIUM SERPL-SCNC: 4.8 MMOL/L
PROT SERPL-MCNC: 7.7 G/DL
PROT UR-MCNC: 23 MG/DL
PROTEIN URINE: 30 MG/DL
PSA FREE FLD-MCNC: 37 %
PSA FREE SERPL-MCNC: 0.44 NG/ML
PSA SERPL-MCNC: 1.17 NG/ML
QUANTIFERON TB PLUS MITOGEN MINUS NIL: >10 IU/ML
QUANTIFERON TB PLUS NIL: 0.02 IU/ML
QUANTIFERON TB PLUS TB1 MINUS NIL: 0.03 IU/ML
QUANTIFERON TB PLUS TB2 MINUS NIL: 0.02 IU/ML
RBC # BLD: 5.66 M/UL
RBC # FLD: 14.6 %
RED BLOOD CELLS URINE: 0 /HPF
SODIUM SERPL-SCNC: 140 MMOL/L
SPECIFIC GRAVITY URINE: 1.01
UROBILINOGEN URINE: 0.2 MG/DL
WBC # FLD AUTO: 8.83 K/UL
WHITE BLOOD CELLS URINE: 0 /HPF

## 2024-10-01 ENCOUNTER — APPOINTMENT (OUTPATIENT)
Dept: NEPHROLOGY | Facility: CLINIC | Age: 64
End: 2024-10-01

## 2024-10-09 ENCOUNTER — APPOINTMENT (OUTPATIENT)
Dept: UROLOGY | Facility: CLINIC | Age: 64
End: 2024-10-09

## 2024-10-25 ENCOUNTER — APPOINTMENT (OUTPATIENT)
Dept: NEPHROLOGY | Facility: CLINIC | Age: 64
End: 2024-10-25

## 2024-12-26 DIAGNOSIS — M32.9 SYSTEMIC LUPUS ERYTHEMATOSUS, UNSPECIFIED: ICD-10-CM

## 2025-01-03 ENCOUNTER — APPOINTMENT (OUTPATIENT)
Dept: UROLOGY | Facility: CLINIC | Age: 65
End: 2025-01-03
Payer: MEDICARE

## 2025-01-03 DIAGNOSIS — N40.1 BENIGN PROSTATIC HYPERPLASIA WITH LOWER URINARY TRACT SYMPMS: ICD-10-CM

## 2025-01-03 LAB
BILIRUB UR QL STRIP: NORMAL
CLARITY UR: CLEAR
COLLECTION METHOD: NORMAL
GLUCOSE UR-MCNC: NORMAL
HCG UR QL: 0.2 EU/DL
HGB UR QL STRIP.AUTO: NORMAL
KETONES UR-MCNC: NORMAL
LEUKOCYTE ESTERASE UR QL STRIP: NORMAL
NITRITE UR QL STRIP: NORMAL
PH UR STRIP: 6
PROT UR STRIP-MCNC: 100
SP GR UR STRIP: 1.02

## 2025-01-03 PROCEDURE — 81003 URINALYSIS AUTO W/O SCOPE: CPT | Mod: QW

## 2025-01-03 PROCEDURE — 99213 OFFICE O/P EST LOW 20 MIN: CPT

## 2025-01-03 PROCEDURE — 76857 US EXAM PELVIC LIMITED: CPT

## 2025-01-21 ENCOUNTER — NON-APPOINTMENT (OUTPATIENT)
Age: 65
End: 2025-01-21

## 2025-01-21 ENCOUNTER — APPOINTMENT (OUTPATIENT)
Dept: NEPHROLOGY | Facility: CLINIC | Age: 65
End: 2025-01-21
Payer: MEDICARE

## 2025-01-21 VITALS — SYSTOLIC BLOOD PRESSURE: 130 MMHG | DIASTOLIC BLOOD PRESSURE: 59 MMHG

## 2025-01-21 DIAGNOSIS — I10 ESSENTIAL (PRIMARY) HYPERTENSION: ICD-10-CM

## 2025-01-21 DIAGNOSIS — E11.22 TYPE 2 DIABETES MELLITUS WITH DIABETIC CHRONIC KIDNEY DISEASE: ICD-10-CM

## 2025-01-21 DIAGNOSIS — N18.30 TYPE 2 DIABETES MELLITUS WITH DIABETIC CHRONIC KIDNEY DISEASE: ICD-10-CM

## 2025-01-21 DIAGNOSIS — R76.8 OTHER SPECIFIED ABNORMAL IMMUNOLOGICAL FINDINGS IN SERUM: ICD-10-CM

## 2025-01-21 PROCEDURE — G2211 COMPLEX E/M VISIT ADD ON: CPT

## 2025-01-21 PROCEDURE — 99205 OFFICE O/P NEW HI 60 MIN: CPT

## 2025-01-24 ENCOUNTER — APPOINTMENT (OUTPATIENT)
Dept: UROLOGY | Facility: CLINIC | Age: 65
End: 2025-01-24